# Patient Record
Sex: MALE | Race: WHITE | NOT HISPANIC OR LATINO | Employment: FULL TIME | URBAN - METROPOLITAN AREA
[De-identification: names, ages, dates, MRNs, and addresses within clinical notes are randomized per-mention and may not be internally consistent; named-entity substitution may affect disease eponyms.]

---

## 2017-04-13 ENCOUNTER — ALLSCRIPTS OFFICE VISIT (OUTPATIENT)
Dept: OTHER | Facility: OTHER | Age: 47
End: 2017-04-13

## 2017-06-06 ENCOUNTER — ALLSCRIPTS OFFICE VISIT (OUTPATIENT)
Dept: OTHER | Facility: OTHER | Age: 47
End: 2017-06-06

## 2018-01-13 VITALS
SYSTOLIC BLOOD PRESSURE: 138 MMHG | RESPIRATION RATE: 14 BRPM | DIASTOLIC BLOOD PRESSURE: 92 MMHG | WEIGHT: 171 LBS | HEIGHT: 72 IN | BODY MASS INDEX: 23.16 KG/M2 | HEART RATE: 78 BPM | TEMPERATURE: 97.1 F

## 2018-01-13 VITALS
HEIGHT: 60 IN | RESPIRATION RATE: 14 BRPM | WEIGHT: 165.38 LBS | SYSTOLIC BLOOD PRESSURE: 126 MMHG | DIASTOLIC BLOOD PRESSURE: 82 MMHG | BODY MASS INDEX: 32.47 KG/M2 | TEMPERATURE: 97.8 F | HEART RATE: 84 BPM

## 2018-02-20 ENCOUNTER — APPOINTMENT (OUTPATIENT)
Dept: URGENT CARE | Facility: CLINIC | Age: 48
End: 2018-02-20

## 2018-02-20 PROCEDURE — G0383 LEV 4 HOSP TYPE B ED VISIT: HCPCS | Performed by: FAMILY MEDICINE

## 2018-02-20 RX ORDER — ZOLPIDEM TARTRATE 5 MG/1
5 TABLET ORAL
COMMUNITY
Start: 2017-06-06 | End: 2018-04-02 | Stop reason: SDUPTHER

## 2018-02-22 ENCOUNTER — OFFICE VISIT (OUTPATIENT)
Dept: FAMILY MEDICINE CLINIC | Facility: CLINIC | Age: 48
End: 2018-02-22
Payer: COMMERCIAL

## 2018-02-22 VITALS
RESPIRATION RATE: 16 BRPM | HEIGHT: 72 IN | TEMPERATURE: 98 F | WEIGHT: 172 LBS | DIASTOLIC BLOOD PRESSURE: 70 MMHG | SYSTOLIC BLOOD PRESSURE: 110 MMHG | HEART RATE: 64 BPM | BODY MASS INDEX: 23.3 KG/M2

## 2018-02-22 DIAGNOSIS — K64.9 HEMORRHOIDS, UNSPECIFIED HEMORRHOID TYPE: ICD-10-CM

## 2018-02-22 DIAGNOSIS — N53.19 ABNORMAL EJACULATION: Primary | ICD-10-CM

## 2018-02-22 DIAGNOSIS — N45.1 EPIDIDYMITIS: ICD-10-CM

## 2018-02-22 LAB
SL AMB  POCT GLUCOSE, UA: NEGATIVE
SL AMB LEUKOCYTE ESTERASE,UA: NEGATIVE
SL AMB POCT BILIRUBIN,UA: NEGATIVE
SL AMB POCT BLOOD,UA: NEGATIVE
SL AMB POCT CLARITY,UA: NORMAL
SL AMB POCT COLOR,UA: YELLOW
SL AMB POCT KETONES,UA: NEGATIVE
SL AMB POCT NITRITE,UA: NEGATIVE
SL AMB POCT PH,UA: 5
SL AMB POCT SPECIFIC GRAVITY,UA: 1.03
SL AMB POCT URINE PROTEIN: NEGATIVE
SL AMB POCT UROBILINOGEN: NEGATIVE

## 2018-02-22 PROCEDURE — 81002 URINALYSIS NONAUTO W/O SCOPE: CPT | Performed by: FAMILY MEDICINE

## 2018-02-22 PROCEDURE — 1036F TOBACCO NON-USER: CPT | Performed by: FAMILY MEDICINE

## 2018-02-22 PROCEDURE — 3008F BODY MASS INDEX DOCD: CPT | Performed by: FAMILY MEDICINE

## 2018-02-22 PROCEDURE — 99214 OFFICE O/P EST MOD 30 MIN: CPT | Performed by: FAMILY MEDICINE

## 2018-02-22 RX ORDER — LEVOFLOXACIN 500 MG/1
500 TABLET, FILM COATED ORAL EVERY 24 HOURS
Qty: 10 TABLET | Refills: 0 | Status: SHIPPED | OUTPATIENT
Start: 2018-02-22 | End: 2018-03-04

## 2018-02-22 NOTE — PATIENT INSTRUCTIONS
Please start Aleve take 2 tablets twice a day after food for the next 7-10 days   please take Levaquin, antibiotic, 1 tablet once a day for 10 days   please schedule you follow-up with Urology and Colorectal surgery   Please  bring your urine sample to our office or our lab

## 2018-02-22 NOTE — PROGRESS NOTES
FAMILY PRACTICE OFFICE VISIT       NAME: Matty Ruiz  AGE: 50 y o  SEX: male       : 1970        MRN: [id number]    DATE: 2018  TIME: 4:42 PM    Assessment and Plan     Problem List Items Addressed This Visit     None      Visit Diagnoses     Abnormal ejaculation    -  Primary    Relevant Orders    UA w Reflex to Microscopic w Reflex to Culture -Lab Collect    Ambulatory referral to Urology    Hemorrhoids, unspecified hemorrhoid type        Epididymitis        Relevant Medications    levofloxacin (LEVAQUIN) 500 mg tablet        Patient presents for evaluation of pelvic floor discomfort  His exam is overall unremarkable aside from localized tenderness at left epididymis  Patient denies testicular pain per se his symptoms could be related to groin strain due to recurrent lifting at work  I advised him to minimize lifting  Will treat him empirically with Levaquin 500 mg daily for 10 days and Aleve over-the-counter as 2 tablets twice a day for the next few days  I strongly advised patient to schedule further evaluation with [facility]  Will facilitate prompt appointment  Urinalysis is normal   Episode of bright red blood per rectum x1  Reported history of hemorrhoids  Patient has been experiencing intermittent symptoms of constipation  I am referring him to colorectal surgeon for further evaluation  Patient denies any recurrences of symptoms  I emphasized avoidance of constipation in prevention of further recurrences of bright red blood per rectum      Patient Instructions   Please start Aleve take 2 tablets twice a day after food for the next 7-10 days   please take Levaquin, antibiotic, 1 tablet once a day for 10 days   please schedule you follow-up with Urology and Colorectal surgery   Please  bring your urine sample to our office or our lab          Chief Complaint     Chief Complaint   Patient presents with    Follow-up     Pt is here w/ c/o small amount of blood in his stool and thinks he may have a hemrhoid        History of Present Illness     Patient presents for evaluation of few concerns  He experienced discomfort during sexual intercourse approximately 7-10 days ago and feels that there is ongoing vaguely defined discomfort/paresthesia in bilateral groin area resembling muscle pull  Patient states that he might of had incomplete injectable a shin during sexual intercourse and is concerned about possible injury  He was not able to achieve erection afterwards  He denies any spontaneous penile discharge or blood in semen, he denies dysuria  No frequency or urgency  Patient is repetitively lifting heavy weights at work  Patient is also concerned about recurrent symptoms of hemorrhoids  He denies any abdominal pain or dyspepsia, no nausea vomiting  He has been experiencing recurrent constipation and noticed bright red blood per rectum x1 after hard bowel movement today's ago  No recurrences  Nose continues bleeding  His appetite is normal    No back pain, no bowel or bladder dysfunction  No rash or edema, no discoloration  Patient denies testicular discomfort        Review of Systems   Review of Systems   Constitutional: Negative  HENT: Negative  Eyes: Negative  Respiratory: Negative  Cardiovascular: Negative  Gastrointestinal: Positive for blood in stool  Negative for abdominal pain  Endocrine: Negative  Genitourinary: Positive for penile pain  Negative for difficulty urinating, dysuria and testicular pain  Musculoskeletal: Negative  Skin: Negative  Neurological: Negative  Hematological: Negative  Psychiatric/Behavioral: The patient is nervous/anxious  Active Problem List   There is no problem list on file for this patient  Past Medical History:  No past medical history on file  Past Surgical History:  No past surgical history on file      Family History:  Family History   Problem Relation Age of Onset    Cancer Mother Social History:  Social History     Social History    Marital status: Single     Spouse name: N/A    Number of children: N/A    Years of education: N/A     Occupational History    Not on file  Social History Main Topics    Smoking status: Never Smoker    Smokeless tobacco: Never Used    Alcohol use Yes      Comment: beer on weekends    Drug use: No    Sexual activity: Not on file     Other Topics Concern    Not on file     Social History Narrative    No narrative on file     I have reviewed the patient's medical history in detail; there are no changes to the history as noted in the electronic medical record  Objective     Vitals:    02/22/18 1559   BP: 110/70   Pulse: 64   Resp: 16   Temp: 98 °F (36 7 °C)     Wt Readings from Last 3 Encounters:   02/22/18 78 kg (172 lb)   06/06/17 77 6 kg (171 lb)   04/13/17 75 kg (165 lb 6 1 oz)       Physical Exam   Constitutional: He appears well-developed and well-nourished  Abdominal: Soft  Normal aorta and bowel sounds are normal  There is no tenderness  There is no rebound and no guarding  Hernia confirmed negative in the right inguinal area and confirmed negative in the left inguinal area  Genitourinary: Penis normal  Right testis shows no mass, no swelling and no tenderness  Left testis shows no mass and no swelling (tenderness at left epididymis)  Circumcised  Lymphadenopathy:        Left: Inguinal (Small subcentimeter left inguinal lymph node, mobile, nontender, rubbery wish, benign characteristics) adenopathy present  Psychiatric: Judgment and thought content normal  His mood appears anxious     Concerned       Pertinent Laboratory/Diagnostic Studies:  No results found for: GLUCOSE, BUN, CREATININE, CALCIUM, NA, K, CO2, CL  No results found for: ALT, AST, GGT, ALKPHOS, BILITOT    No results found for: WBC, HGB, HCT, MCV, PLT    No results found for: TSH    No results found for: CHOL  No results found for: TRIG  No results found for: HDL  No results found for: [address]  No results found for: HGBA1C    No results found for this or any previous visit  Orders Placed This Encounter   Procedures    UA w Reflex to Microscopic w Reflex to Culture -Lab Collect    Ambulatory referral to Urology       ALLERGIES:  Allergies   Allergen Reactions    Bactrim [Sulfamethoxazole-Trimethoprim] Hives    Sulfa Antibiotics        Current Medications     Current Outpatient Prescriptions   Medication Sig Dispense Refill    zolpidem (AMBIEN) 5 mg tablet Take 5 mg by mouth daily at bedtime as needed        levofloxacin (LEVAQUIN) 500 mg tablet Take 1 tablet (500 mg total) by mouth every 24 hours for 10 days 10 tablet 0     No current facility-administered medications for this visit  Health Maintenance   There are no preventive care reminders to display for this patient  There is no immunization history on file for this patient      Jamie Balbuena MD

## 2018-04-02 DIAGNOSIS — G47.00 INSOMNIA, UNSPECIFIED TYPE: Primary | ICD-10-CM

## 2018-04-02 RX ORDER — ZOLPIDEM TARTRATE 5 MG/1
TABLET ORAL
Qty: 30 TABLET | Refills: 2 | Status: SHIPPED | OUTPATIENT
Start: 2018-04-02 | End: 2018-07-02 | Stop reason: SDUPTHER

## 2018-07-02 DIAGNOSIS — G47.00 INSOMNIA, UNSPECIFIED TYPE: ICD-10-CM

## 2018-07-02 RX ORDER — ZOLPIDEM TARTRATE 5 MG/1
TABLET ORAL
Qty: 30 TABLET | Refills: 2 | Status: SHIPPED | OUTPATIENT
Start: 2018-07-02 | End: 2018-10-01 | Stop reason: SDUPTHER

## 2018-10-01 DIAGNOSIS — G47.00 INSOMNIA, UNSPECIFIED TYPE: ICD-10-CM

## 2018-10-01 RX ORDER — ZOLPIDEM TARTRATE 5 MG/1
TABLET ORAL
Qty: 30 TABLET | Refills: 2 | Status: SHIPPED | OUTPATIENT
Start: 2018-10-01 | End: 2018-12-26 | Stop reason: SDUPTHER

## 2018-12-26 DIAGNOSIS — G47.00 INSOMNIA, UNSPECIFIED TYPE: ICD-10-CM

## 2018-12-26 RX ORDER — ZOLPIDEM TARTRATE 5 MG/1
TABLET ORAL
Qty: 30 TABLET | Refills: 2 | Status: SHIPPED | OUTPATIENT
Start: 2018-12-26 | End: 2019-04-02 | Stop reason: SDUPTHER

## 2019-01-17 ENCOUNTER — OFFICE VISIT (OUTPATIENT)
Dept: FAMILY MEDICINE CLINIC | Facility: CLINIC | Age: 49
End: 2019-01-17

## 2019-01-17 VITALS
SYSTOLIC BLOOD PRESSURE: 130 MMHG | HEIGHT: 72 IN | DIASTOLIC BLOOD PRESSURE: 80 MMHG | HEART RATE: 62 BPM | TEMPERATURE: 98.3 F | WEIGHT: 174.2 LBS | RESPIRATION RATE: 16 BRPM | BODY MASS INDEX: 23.6 KG/M2

## 2019-01-17 DIAGNOSIS — K64.9 HEMORRHOIDS, UNSPECIFIED HEMORRHOID TYPE: Primary | ICD-10-CM

## 2019-01-17 PROCEDURE — 99213 OFFICE O/P EST LOW 20 MIN: CPT | Performed by: FAMILY MEDICINE

## 2019-01-17 NOTE — PROGRESS NOTES
FAMILY PRACTICE OFFICE VISIT       NAME: Matty Ruiz  AGE: 46 y o  SEX: male       : 1970        MRN: [id number]    DATE: 2019  TIME: 4:19 PM    Assessment and Plan     Problem List Items Addressed This Visit     Hemorrhoids - Primary    Relevant Medications    hydrocortisone (ANUSOL-HC, PROCTOSOL HC,) 2 5 % rectal cream       Patient was given prescription for Anusol HC cream to apply 2-3 times daily on the affected area  He will continue with Sitz baths at least once daily if not more often  If symptoms persist over the next 10 days without improvement he was also given referral to colorectal surgeon Tapan Aleman for consultation and treatment      There are no Patient Instructions on file for this visit  Chief Complaint     Chief Complaint   Patient presents with    Hemorrhoids     Patient is here for hemmorrhoids and a rash x 1 month  History of Present Illness     Patient states for the past week or 2 he has noticed discomfort and itching of his rectal area  He has tried [employer] without much relief in his symptoms  He denies any constipation or heavy lifting  He states he has had intermittent bouts with hemorrhoids since he was a teenager  He denies any melena or hematochezia        Review of Systems   Review of Systems   Constitutional: Negative  Gastrointestinal: Negative  Genitourinary:        As per HPI       Active Problem List     Patient Active Problem List   Diagnosis    Hemorrhoids       Past Medical History:  No past medical history on file  Past Surgical History:  No past surgical history on file  Family History:  Family History   Problem Relation Age of Onset   Jessie Lynch Cancer Mother     COPD Mother        Social History:  Social History     Social History    Marital status: Single     Spouse name: N/A    Number of children: N/A    Years of education: N/A     Occupational History    Not on file       Social History Main Topics    Smoking status: Never Smoker    Smokeless tobacco: Never Used    Alcohol use Yes      Comment: beer on weekends    Drug use: No    Sexual activity: Yes     Other Topics Concern    Not on file     Social History Narrative    No narrative on file       Objective     Vitals:    01/17/19 1558   BP: 130/80   Pulse: 62   Resp: 16   Temp: 98 3 °F (36 8 °C)     Wt Readings from Last 3 Encounters:   01/17/19 79 kg (174 lb 3 2 oz)   02/22/18 78 kg (172 lb)   06/06/17 77 6 kg (171 lb)       Physical Exam   Constitutional: No distress  Abdominal:   External examination of rectal area shows several large external hemorrhoids that are skin colored  There is no signs of blood clotting  He does have some perirectal redness of skin  Pertinent Laboratory/Diagnostic Studies:  No results found for: GLUCOSE, BUN, CREATININE, CALCIUM, NA, K, CO2, CL  No results found for: ALT, AST, GGT, ALKPHOS, BILITOT    No results found for: WBC, HGB, HCT, MCV, PLT    No results found for: TSH    No results found for: CHOL  No results found for: TRIG  No results found for: HDL  No results found for: LDLCALC  No results found for: HGBA1C    Results for orders placed or performed in visit on 02/22/18   POCT urine dip   Result Value Ref Range    LEUKOCYTE ESTERASE,UA negative     NITRITE,UA negative     SL AMB POCT UROBILINOGEN negative     POCT URINE PROTEIN negative      PH,UA 5 0     BLOOD,UA negative     SPECIFIC GRAVITY,UA 1 030     KETONES,UA negative     BILIRUBIN,UA negative     GLUCOSE, UA negative      COLOR,UA yellow     CLARITY,UA transparent        No orders of the defined types were placed in this encounter        ALLERGIES:  Allergies   Allergen Reactions    Bactrim [Sulfamethoxazole-Trimethoprim] Hives    Sulfa Antibiotics        Current Medications     Current Outpatient Prescriptions   Medication Sig Dispense Refill    zolpidem (AMBIEN) 5 mg tablet TAKE 1 TABLET BY MOUTH EVERY DAY AT BEDTIME AS NEEDED 30 tablet 2    hydrocortisone (ANUSOL-HC, PROCTOSOL HC,) 2 5 % rectal cream Insert into the rectum 3 (three) times a day 28 35 g 3     No current facility-administered medications for this visit  Health Maintenance     Health Maintenance   Topic Date Due    CRC Screening: Colonoscopy  1970    DTaP,Tdap,and Td Vaccines (1 - Tdap) 12/19/1989    INFLUENZA VACCINE  07/01/2018    Depression Screening PHQ  01/17/2020       There is no immunization history on file for this patient      Carleen Hogan MD

## 2019-03-27 DIAGNOSIS — K64.9 HEMORRHOIDS, UNSPECIFIED HEMORRHOID TYPE: ICD-10-CM

## 2019-04-02 DIAGNOSIS — G47.00 INSOMNIA, UNSPECIFIED TYPE: ICD-10-CM

## 2019-04-02 RX ORDER — ZOLPIDEM TARTRATE 5 MG/1
TABLET ORAL
Qty: 30 TABLET | Refills: 2 | Status: SHIPPED | OUTPATIENT
Start: 2019-04-02 | End: 2019-07-01 | Stop reason: SDUPTHER

## 2019-04-26 DIAGNOSIS — K64.9 HEMORRHOIDS, UNSPECIFIED HEMORRHOID TYPE: ICD-10-CM

## 2019-07-01 DIAGNOSIS — K64.9 HEMORRHOIDS, UNSPECIFIED HEMORRHOID TYPE: ICD-10-CM

## 2019-07-01 DIAGNOSIS — G47.00 INSOMNIA, UNSPECIFIED TYPE: ICD-10-CM

## 2019-07-01 RX ORDER — ZOLPIDEM TARTRATE 5 MG/1
TABLET ORAL
Qty: 30 TABLET | Refills: 2 | Status: SHIPPED | OUTPATIENT
Start: 2019-07-01 | End: 2019-09-30 | Stop reason: SDUPTHER

## 2019-07-03 DIAGNOSIS — K64.9 HEMORRHOIDS, UNSPECIFIED HEMORRHOID TYPE: ICD-10-CM

## 2019-09-20 DIAGNOSIS — K64.9 HEMORRHOIDS, UNSPECIFIED HEMORRHOID TYPE: ICD-10-CM

## 2019-09-30 DIAGNOSIS — G47.00 INSOMNIA, UNSPECIFIED TYPE: ICD-10-CM

## 2019-10-01 RX ORDER — ZOLPIDEM TARTRATE 5 MG/1
TABLET ORAL
Qty: 30 TABLET | Refills: 2 | Status: SHIPPED | OUTPATIENT
Start: 2019-10-01 | End: 2019-12-30 | Stop reason: SDUPTHER

## 2019-12-02 DIAGNOSIS — K64.9 HEMORRHOIDS, UNSPECIFIED HEMORRHOID TYPE: ICD-10-CM

## 2019-12-30 DIAGNOSIS — G47.00 INSOMNIA, UNSPECIFIED TYPE: ICD-10-CM

## 2019-12-30 RX ORDER — ZOLPIDEM TARTRATE 5 MG/1
TABLET ORAL
Qty: 30 TABLET | Refills: 0 | Status: SHIPPED | OUTPATIENT
Start: 2019-12-30 | End: 2020-02-05 | Stop reason: SDUPTHER

## 2020-01-20 ENCOUNTER — APPOINTMENT (OUTPATIENT)
Dept: URGENT CARE | Facility: CLINIC | Age: 50
End: 2020-01-20
Payer: OTHER MISCELLANEOUS

## 2020-01-20 ENCOUNTER — APPOINTMENT (OUTPATIENT)
Dept: RADIOLOGY | Facility: CLINIC | Age: 50
End: 2020-01-20
Payer: OTHER MISCELLANEOUS

## 2020-01-20 DIAGNOSIS — S39.92XA LOWER BACK INJURY, INITIAL ENCOUNTER: Primary | ICD-10-CM

## 2020-01-20 DIAGNOSIS — S39.92XA LOWER BACK INJURY, INITIAL ENCOUNTER: ICD-10-CM

## 2020-01-20 PROCEDURE — 72100 X-RAY EXAM L-S SPINE 2/3 VWS: CPT

## 2020-01-20 PROCEDURE — G0383 LEV 4 HOSP TYPE B ED VISIT: HCPCS | Performed by: NURSE PRACTITIONER

## 2020-01-20 PROCEDURE — 99284 EMERGENCY DEPT VISIT MOD MDM: CPT | Performed by: NURSE PRACTITIONER

## 2020-01-24 ENCOUNTER — APPOINTMENT (OUTPATIENT)
Dept: URGENT CARE | Facility: CLINIC | Age: 50
End: 2020-01-24
Payer: OTHER MISCELLANEOUS

## 2020-01-24 PROCEDURE — 99213 OFFICE O/P EST LOW 20 MIN: CPT | Performed by: PHYSICIAN ASSISTANT

## 2020-01-31 ENCOUNTER — APPOINTMENT (OUTPATIENT)
Dept: URGENT CARE | Facility: CLINIC | Age: 50
End: 2020-01-31
Payer: OTHER MISCELLANEOUS

## 2020-01-31 DIAGNOSIS — G47.00 INSOMNIA, UNSPECIFIED TYPE: ICD-10-CM

## 2020-01-31 PROCEDURE — 99213 OFFICE O/P EST LOW 20 MIN: CPT | Performed by: PHYSICIAN ASSISTANT

## 2020-02-03 RX ORDER — ZOLPIDEM TARTRATE 5 MG/1
TABLET ORAL
Qty: 30 TABLET | Refills: 0 | OUTPATIENT
Start: 2020-02-03

## 2020-02-04 ENCOUNTER — TELEPHONE (OUTPATIENT)
Dept: FAMILY MEDICINE CLINIC | Facility: CLINIC | Age: 50
End: 2020-02-04

## 2020-02-04 NOTE — TELEPHONE ENCOUNTER
Pt came in asking about the Zolpidem, it was refused to be refilled, I told him it may be due to the fact that he has not been seen in a year, he is coming on Friday @4pm, but he would like to know if he can have a temporary supply of the zolpidem for the next couple days until he is seen, please advise @ [phone]

## 2020-02-05 DIAGNOSIS — G47.00 INSOMNIA, UNSPECIFIED TYPE: ICD-10-CM

## 2020-02-05 RX ORDER — ZOLPIDEM TARTRATE 5 MG/1
5 TABLET ORAL
Qty: 30 TABLET | Refills: 0 | Status: SHIPPED | OUTPATIENT
Start: 2020-02-05 | End: 2020-02-07 | Stop reason: SDUPTHER

## 2020-02-07 ENCOUNTER — OFFICE VISIT (OUTPATIENT)
Dept: FAMILY MEDICINE CLINIC | Facility: CLINIC | Age: 50
End: 2020-02-07
Payer: COMMERCIAL

## 2020-02-07 VITALS
OXYGEN SATURATION: 97 % | BODY MASS INDEX: 24.06 KG/M2 | TEMPERATURE: 98.3 F | WEIGHT: 177.6 LBS | RESPIRATION RATE: 16 BRPM | HEART RATE: 64 BPM | SYSTOLIC BLOOD PRESSURE: 130 MMHG | HEIGHT: 72 IN | DIASTOLIC BLOOD PRESSURE: 88 MMHG

## 2020-02-07 DIAGNOSIS — G47.00 INSOMNIA, UNSPECIFIED TYPE: ICD-10-CM

## 2020-02-07 DIAGNOSIS — Z00.00 WELL ADULT EXAM: Primary | ICD-10-CM

## 2020-02-07 PROBLEM — G47.09 OTHER INSOMNIA: Status: ACTIVE | Noted: 2020-02-07

## 2020-02-07 PROCEDURE — 99396 PREV VISIT EST AGE 40-64: CPT | Performed by: FAMILY MEDICINE

## 2020-02-07 PROCEDURE — 3008F BODY MASS INDEX DOCD: CPT | Performed by: FAMILY MEDICINE

## 2020-02-07 RX ORDER — ZOLPIDEM TARTRATE 5 MG/1
5 TABLET ORAL
Qty: 30 TABLET | Refills: 5 | Status: SHIPPED | OUTPATIENT
Start: 2020-02-07 | End: 2020-07-06 | Stop reason: SDUPTHER

## 2020-02-07 NOTE — ASSESSMENT & PLAN NOTE
Well adult  Patient overall appears to be in stable health  He was given a refill on his zolpidem 5 milligrams to use as needed for insomnia    He was given referral to have colonoscopy for colon cancer screening test

## 2020-02-07 NOTE — PROGRESS NOTES
FAMILY PRACTICE OFFICE VISIT       NAME: Matty Ruiz  AGE: 44 y o  SEX: male       : 1970        MRN: [id number]    DATE: 2020  TIME: 5:13 PM    Assessment and Plan     Problem List Items Addressed This Visit        Other    Insomnia    Relevant Medications    zolpidem (AMBIEN) 5 mg tablet    Well adult exam - Primary     Well adult  Patient overall appears to be in stable health  He was given a refill on his zolpidem 5 milligrams to use as needed for insomnia  He was given referral to have colonoscopy for colon cancer screening test                    Chief Complaint     Chief Complaint   Patient presents with    Annual Exam     Physical       History of Present Illness     Patient denies any recent illness  He reports having a muscle strain of his left back for which she was seen by a company physician  He has been doing some stretches and feels like symptoms are slowly improving  Patient continues to require zolpidem 5 milligrams at bedtime for sleeping since his mother passed away approximately 2 years ago  Review of Systems   Review of Systems   Constitutional: Negative  HENT: Negative  Respiratory: Negative  Cardiovascular: Negative  Gastrointestinal: Negative  Genitourinary: Negative  Musculoskeletal:        As per HPI   Neurological: Negative  Psychiatric/Behavioral:        As per HPI       Active Problem List     Patient Active Problem List   Diagnosis    Hemorrhoids    Insomnia    Well adult exam       Past Medical History:  No past medical history on file  Past Surgical History:  No past surgical history on file      Family History:  Family History   Problem Relation Age of Onset   [place] Drop Cancer Mother     COPD Mother        Social History:  Social History     Socioeconomic History    Marital status: Single     Spouse name: Not on file    Number of children: Not on file    Years of education: Not on file    Highest education level: Not on file Occupational History    Not on file   Social Needs    Financial resource strain: Not on file    Food insecurity:     Worry: Not on file     Inability: Not on file    Transportation needs:     Medical: Not on file     Non-medical: Not on file   Tobacco Use    Smoking status: Never Smoker    Smokeless tobacco: Never Used   Substance and Sexual Activity    Alcohol use: Yes     Frequency: Monthly or less     Comment: beer on weekends    Drug use: No    Sexual activity: Yes   Lifestyle    Physical activity:     Days per week: Not on file     Minutes per session: Not on file    Stress: Not on file   Relationships    Social connections:     Talks on phone: Not on file     Gets together: Not on file     Attends Evangelical service: Not on file     Active member of club or organization: Not on file     Attends meetings of clubs or organizations: Not on file     Relationship status: Not on file    Intimate partner violence:     Fear of current or ex partner: Not on file     Emotionally abused: Not on file     Physically abused: Not on file     Forced sexual activity: Not on file   Other Topics Concern    Not on file   Social History Narrative    Not on file       Objective     Vitals:    02/07/20 1551   BP: 130/88   Pulse: 64   Resp: 16   Temp: 98 3 °F (36 8 °C)   SpO2: 97%     Wt Readings from Last 3 Encounters:   02/07/20 80 6 kg (177 lb 9 6 oz)   01/17/19 79 kg (174 lb 3 2 oz)   02/22/18 78 kg (172 lb)       Physical Exam   Constitutional: He is oriented to person, place, and time  No distress  HENT:   Right Ear: External ear normal    Left Ear: External ear normal    Mouth/Throat: Oropharynx is clear and moist  No oropharyngeal exudate  Tympanic membranes within normal limits bilaterally   Cardiovascular: Normal rate, regular rhythm and normal heart sounds  No murmur heard  Pulmonary/Chest: Effort normal and breath sounds normal  No respiratory distress  He has no wheezes  He has no rales  Abdominal: Soft  Bowel sounds are normal  He exhibits no distension and no mass  There is no tenderness  There is no rebound and no guarding  Musculoskeletal: He exhibits no edema  Lymphadenopathy:     He has no cervical adenopathy  Neurological: He is alert and oriented to person, place, and time  No cranial nerve deficit  Psychiatric: He has a normal mood and affect  His behavior is normal  Judgment and thought content normal        Pertinent Laboratory/Diagnostic Studies:  No results found for: GLUCOSE, BUN, CREATININE, CALCIUM, NA, K, CO2, CL  No results found for: ALT, AST, GGT, ALKPHOS, BILITOT    No results found for: WBC, HGB, HCT, MCV, PLT    No results found for: TSH    No results found for: CHOL  No results found for: TRIG  No results found for: HDL  No results found for: LDLCALC  No results found for: HGBA1C    Results for orders placed or performed in visit on 02/22/18   POCT urine dip   Result Value Ref Range    LEUKOCYTE ESTERASE,UA negative     NITRITE,UA negative     SL AMB POCT UROBILINOGEN negative     POCT URINE PROTEIN negative      PH,UA 5 0     BLOOD,UA negative     SPECIFIC GRAVITY,UA 1 030     KETONES,UA negative     BILIRUBIN,UA negative     GLUCOSE, UA negative      COLOR,UA yellow     CLARITY,UA transparent        No orders of the defined types were placed in this encounter  ALLERGIES:  Allergies   Allergen Reactions    Bactrim [Sulfamethoxazole-Trimethoprim] Hives    Sulfa Antibiotics        Current Medications     Current Outpatient Medications   Medication Sig Dispense Refill    PROCTOSOL HC 2 5 % rectal cream INSERT INTO THE RECTUM 3 (THREE) TIMES A DAY 28 35 g 3    zolpidem (AMBIEN) 5 mg tablet Take 1 tablet (5 mg total) by mouth daily at bedtime as needed (as needed) 30 tablet 5     No current facility-administered medications for this visit            Health Maintenance     Health Maintenance   Topic Date Due    CRC Screening: Colonoscopy  1970    DTaP,Tdap,and Td Vaccines (1 - Tdap) 12/19/1979    HIV Screening  12/19/1983    Annual Physical  12/19/1986    Influenza Vaccine  03/05/2020 (Originally 7/1/2019)    Depression Screening PHQ  02/07/2021    BMI: Adult  02/07/2021    Pneumococcal Vaccine: 65+ Years (1 of 2 - PCV13) 12/19/2033    Pneumococcal Vaccine: Pediatrics (0 to 5 Years) and At-Risk Patients (6 to 59 Years)  Aged Out    HIB Vaccine  Aged Out    Hepatitis B Vaccine  Aged Out    IPV Vaccine  Aged Out    Hepatitis A Vaccine  Aged Out    Meningococcal ACWY Vaccine  Aged Out    HPV Vaccine  Aged Out       There is no immunization history on file for this patient      Sabrina Danielson MD

## 2020-02-14 ENCOUNTER — APPOINTMENT (OUTPATIENT)
Dept: URGENT CARE | Facility: CLINIC | Age: 50
End: 2020-02-14
Payer: OTHER MISCELLANEOUS

## 2020-02-14 PROCEDURE — 99213 OFFICE O/P EST LOW 20 MIN: CPT | Performed by: PREVENTIVE MEDICINE

## 2020-02-22 DIAGNOSIS — K64.9 HEMORRHOIDS, UNSPECIFIED HEMORRHOID TYPE: ICD-10-CM

## 2020-03-23 DIAGNOSIS — K64.9 HEMORRHOIDS, UNSPECIFIED HEMORRHOID TYPE: ICD-10-CM

## 2020-06-04 DIAGNOSIS — K64.9 HEMORRHOIDS, UNSPECIFIED HEMORRHOID TYPE: ICD-10-CM

## 2020-06-04 RX ORDER — HYDROCORTISONE 25 MG/G
CREAM TOPICAL
Qty: 28.35 G | Refills: 1 | Status: SHIPPED | OUTPATIENT
Start: 2020-06-04 | End: 2020-08-30

## 2020-07-06 DIAGNOSIS — G47.00 INSOMNIA, UNSPECIFIED TYPE: ICD-10-CM

## 2020-07-06 RX ORDER — ZOLPIDEM TARTRATE 5 MG/1
5 TABLET ORAL
Qty: 30 TABLET | Refills: 0 | Status: SHIPPED | OUTPATIENT
Start: 2020-07-06 | End: 2020-08-07

## 2020-08-06 DIAGNOSIS — G47.00 INSOMNIA, UNSPECIFIED TYPE: ICD-10-CM

## 2020-08-07 RX ORDER — ZOLPIDEM TARTRATE 5 MG/1
5 TABLET ORAL
Qty: 30 TABLET | Refills: 3 | Status: SHIPPED | OUTPATIENT
Start: 2020-08-07 | End: 2020-12-07

## 2020-08-29 DIAGNOSIS — K64.9 HEMORRHOIDS, UNSPECIFIED HEMORRHOID TYPE: ICD-10-CM

## 2020-08-30 RX ORDER — HYDROCORTISONE 25 MG/G
CREAM TOPICAL
Qty: 28.35 G | Refills: 1 | Status: SHIPPED | OUTPATIENT
Start: 2020-08-30 | End: 2020-09-29

## 2020-09-29 DIAGNOSIS — K64.9 HEMORRHOIDS, UNSPECIFIED HEMORRHOID TYPE: ICD-10-CM

## 2020-09-29 RX ORDER — HYDROCORTISONE 25 MG/G
CREAM TOPICAL
Qty: 30 G | Refills: 1 | Status: SHIPPED | OUTPATIENT
Start: 2020-09-29 | End: 2020-11-02

## 2020-11-02 DIAGNOSIS — K64.9 HEMORRHOIDS, UNSPECIFIED HEMORRHOID TYPE: ICD-10-CM

## 2020-11-02 RX ORDER — HYDROCORTISONE 25 MG/G
CREAM TOPICAL
Qty: 30 G | Refills: 1 | Status: SHIPPED | OUTPATIENT
Start: 2020-11-02 | End: 2021-02-05

## 2020-12-07 DIAGNOSIS — G47.00 INSOMNIA, UNSPECIFIED TYPE: ICD-10-CM

## 2020-12-07 RX ORDER — ZOLPIDEM TARTRATE 5 MG/1
5 TABLET ORAL
Qty: 30 TABLET | Refills: 3 | Status: SHIPPED | OUTPATIENT
Start: 2020-12-07 | End: 2021-04-06

## 2021-02-05 DIAGNOSIS — K64.9 HEMORRHOIDS, UNSPECIFIED HEMORRHOID TYPE: ICD-10-CM

## 2021-02-05 RX ORDER — HYDROCORTISONE 25 MG/G
CREAM TOPICAL
Qty: 30 G | Refills: 1 | Status: SHIPPED | OUTPATIENT
Start: 2021-02-05 | End: 2021-03-08

## 2021-03-07 DIAGNOSIS — K64.9 HEMORRHOIDS, UNSPECIFIED HEMORRHOID TYPE: ICD-10-CM

## 2021-03-08 RX ORDER — HYDROCORTISONE 25 MG/G
CREAM TOPICAL
Qty: 30 G | Refills: 1 | Status: SHIPPED | OUTPATIENT
Start: 2021-03-08 | End: 2021-05-07

## 2021-04-05 DIAGNOSIS — G47.00 INSOMNIA, UNSPECIFIED TYPE: ICD-10-CM

## 2021-04-06 RX ORDER — ZOLPIDEM TARTRATE 5 MG/1
5 TABLET ORAL
Qty: 30 TABLET | Refills: 3 | Status: SHIPPED | OUTPATIENT
Start: 2021-04-06 | End: 2021-08-05 | Stop reason: SDUPTHER

## 2021-05-07 DIAGNOSIS — K64.9 HEMORRHOIDS, UNSPECIFIED HEMORRHOID TYPE: ICD-10-CM

## 2021-05-07 RX ORDER — HYDROCORTISONE 25 MG/G
CREAM TOPICAL
Qty: 30 G | Refills: 1 | Status: SHIPPED | OUTPATIENT
Start: 2021-05-07 | End: 2021-07-06

## 2021-08-04 DIAGNOSIS — G47.00 INSOMNIA, UNSPECIFIED TYPE: ICD-10-CM

## 2021-08-05 RX ORDER — ZOLPIDEM TARTRATE 5 MG/1
5 TABLET ORAL
Qty: 30 TABLET | Refills: 3 | OUTPATIENT
Start: 2021-08-05

## 2021-08-05 NOTE — TELEPHONE ENCOUNTER
Patient scheduled for 8/13 with Chel  Patient request small quantity to be sent to pharmacy to get him through until the appointment

## 2021-08-13 ENCOUNTER — OFFICE VISIT (OUTPATIENT)
Dept: FAMILY MEDICINE CLINIC | Facility: CLINIC | Age: 51
End: 2021-08-13

## 2021-08-13 DIAGNOSIS — R53.83 FATIGUE, UNSPECIFIED TYPE: ICD-10-CM

## 2021-08-13 DIAGNOSIS — K64.9 HEMORRHOIDS, UNSPECIFIED HEMORRHOID TYPE: ICD-10-CM

## 2021-08-13 DIAGNOSIS — Z13.220 LIPID SCREENING: ICD-10-CM

## 2021-08-13 DIAGNOSIS — Z00.00 PHYSICAL EXAM: Primary | ICD-10-CM

## 2021-08-13 DIAGNOSIS — G47.00 INSOMNIA, UNSPECIFIED TYPE: ICD-10-CM

## 2021-08-13 PROCEDURE — 3008F BODY MASS INDEX DOCD: CPT | Performed by: NURSE PRACTITIONER

## 2021-08-13 PROCEDURE — 3725F SCREEN DEPRESSION PERFORMED: CPT | Performed by: NURSE PRACTITIONER

## 2021-08-13 PROCEDURE — 99396 PREV VISIT EST AGE 40-64: CPT | Performed by: NURSE PRACTITIONER

## 2021-08-13 PROCEDURE — 1036F TOBACCO NON-USER: CPT | Performed by: NURSE PRACTITIONER

## 2021-08-13 RX ORDER — ZOLPIDEM TARTRATE 5 MG/1
5 TABLET ORAL
Qty: 30 TABLET | Refills: 0 | Status: SHIPPED | OUTPATIENT
Start: 2021-08-13 | End: 2021-09-13

## 2021-08-13 RX ORDER — HYDROCORTISONE 25 MG/G
CREAM TOPICAL
Qty: 30 G | Refills: 1 | Status: SHIPPED | OUTPATIENT
Start: 2021-08-13 | End: 2021-12-14

## 2021-08-13 NOTE — PROGRESS NOTES
FAMILY PRACTICE OFFICE VISIT       NAME: Matty Ruiz  AGE: 44 y o  SEX: male       : 1970        MRN: [id number]    Assessment and Plan     Problem List Items Addressed This Visit        Digestive    Hemorrhoids    Relevant Medications    hydrocortisone (ANUSOL-HC) 2 5 % rectal cream       Other    Insomnia    Relevant Medications    zolpidem (AMBIEN) 5 mg tablet      Other Visit Diagnoses     Physical exam    -  Primary    Lipid screening        Relevant Orders    Lipid panel    Fatigue, unspecified type        Relevant Orders    CBC    Comprehensive metabolic panel    TSH, 3rd generation          1  Physical exam     2  Lipid screening  Lipid panel   3  Fatigue, unspecified type  CBC    Comprehensive metabolic panel    TSH, 3rd generation   4  Hemorrhoids, unspecified hemorrhoid type  hydrocortisone (ANUSOL-HC) 2 5 % rectal cream   5  Insomnia, unspecified type  zolpidem (AMBIEN) 5 mg tablet     Healthy-appearing 57-year-old male presenting today for annual physical exam     Declines Tdap, COVID-19, Shingrix vaccinations  Declines colonoscopy  Declines PSA screening  Will consider checking blood work as noted above  Prescriptions provided  Hemorrhoids: Flares controlled with Anusol  Prescription provided  Insomnia:  Working shift work, 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  to 3:00 a m  Allison Clintonien has been effective for sleep  Follow-up in 1 year for annual physical or sooner if needed  Chief Complaint     Chief Complaint   Patient presents with    Follow-up     medications        History of Present Illness     Chong Soto is a 57-year-old male presenting today for annual physical exam and prescription refills  Uses Anusol for temporary relief of hemorrhoid flares  Does a lot of physical labor and heavy lifting for work, which contributes to hemorrhoid flares  Using Ambien as needed for sleep  Has trouble getting his mind to settle down so he can sleep  With Ambien he is able to fall sleep   Has been using Ambien since around 2018  Experiences no morning grogginess  Sleeps 7:00 p m  to 3:00 a m  due to work schedule  Has never had a colonoscopy and declines  Mom lung cancer, dad prostate cancer  No siblings  COVid vaccines--declines  Last tdap vaccine unsure, likely long ago  Declines up date today  Shingrix: declines  Exercising--   Hiking and enjoys outdoor activites  Work is physical labor  Grounds maintenance at [employer]  Dental exam is up-to-date  Due for eye exam, wears glasses  Review of Systems   Review of Systems   Constitutional: Negative  HENT: Negative  Respiratory: Negative  Cardiovascular: Negative  Gastrointestinal: Negative  Genitourinary: Negative  Musculoskeletal: Negative  Skin: Negative  Neurological: Negative  Hematological: Negative  Psychiatric/Behavioral: Negative  Active Problem List     Patient Active Problem List   Diagnosis    Hemorrhoids    Insomnia    Well adult exam       Past Medical History:  History reviewed  No pertinent past medical history  Past Surgical History:  History reviewed  No pertinent surgical history      Family History:  Family History   Problem Relation Age of Onset   Sandi Isra Cancer Mother     COPD Mother    Sandialanna Matael Lung cancer Mother     Prostate cancer Father        Social History:  Social History     Socioeconomic History    Marital status: Single     Spouse name: Not on file    Number of children: Not on file    Years of education: Not on file    Highest education level: Not on file   Occupational History    Not on file   Tobacco Use    Smoking status: Never Smoker    Smokeless tobacco: Never Used   Vaping Use    Vaping Use: Never used   Substance and Sexual Activity    Alcohol use: Yes     Comment: beer on weekends    Drug use: No    Sexual activity: Yes   Other Topics Concern    Not on file   Social History Narrative    Not on file     Social Determinants of Health     Financial Resource Strain:     Difficulty of Paying Living Expenses:    Food Insecurity:     Worried About Running Out of Food in the Last Year:     [address] in the Last Year:    Transportation Needs:     Lack of Transportation (Medical):  Lack of Transportation (Non-Medical):    Physical Activity:     Days of Exercise per Week:     Minutes of Exercise per Session:    Stress:     Feeling of Stress :    Social Connections:     Frequency of Communication with Friends and Family:     Frequency of Social Gatherings with Friends and Family:     Attends [employer] Services:     Active Member of Clubs or Organizations:     Attends Club or Organization Meetings:     Marital Status:    Intimate Partner Violence:     Fear of Current or Ex-Partner:     Emotionally Abused:     Physically Abused:     Sexually Abused:        I have reviewed the patient's medical history in detail; there are no changes to the history as noted in the electronic medical record  Objective     Vitals:    08/13/21 1539 08/13/21 1600   BP: 150/88 134/80   Pulse: 60    Resp: 16    Temp: 98 2 °F (36 8 °C)    SpO2: 98%    Weight: 76 3 kg (168 lb 4 oz)    Height: 5' 8 5" (1 74 m)      Wt Readings from Last 3 Encounters:   08/13/21 76 3 kg (168 lb 4 oz)   02/07/20 80 6 kg (177 lb 9 6 oz)   01/17/19 79 kg (174 lb 3 2 oz)     Physical Exam  Vitals and nursing note reviewed  Constitutional:       General: He is not in acute distress  Appearance: Normal appearance  He is well-developed  He is not ill-appearing  HENT:      Head: Normocephalic and atraumatic  Right Ear: Tympanic membrane and ear canal normal       Left Ear: Tympanic membrane and ear canal normal    Eyes:      Conjunctiva/sclera: Conjunctivae normal       Pupils: Pupils are equal, round, and reactive to light  Neck:      Thyroid: No thyromegaly  Cardiovascular:      Rate and Rhythm: Normal rate and regular rhythm  Heart sounds:  No murmur heard  Pulmonary:      Effort: Pulmonary effort is normal  No respiratory distress  Breath sounds: Normal breath sounds  No wheezing or rales  Abdominal:      General: Bowel sounds are normal       Palpations: Abdomen is soft  Tenderness: There is no abdominal tenderness  Musculoskeletal:      Cervical back: Normal range of motion and neck supple  Right lower leg: No edema  Left lower leg: No edema  Lymphadenopathy:      Cervical: No cervical adenopathy  Skin:     Findings: No rash  Neurological:      Mental Status: He is alert and oriented to person, place, and time  Psychiatric:         Mood and Affect: Mood normal        BMI Counseling: Body mass index is 25 21 kg/m²  The BMI is above normal  Nutrition recommendations include encouraging healthy choices of fruits and vegetables, moderation in carbohydrate intake and increasing intake of lean protein  ALLERGIES:  Allergies   Allergen Reactions    Bactrim [Sulfamethoxazole-Trimethoprim] Hives    Sulfa Antibiotics        Current Medications     Current Outpatient Medications   Medication Sig Dispense Refill    hydrocortisone (ANUSOL-HC) 2 5 % rectal cream INSERT INTO THE RECTUM 3 TIMES A DAY AS NEEDED 30 g 1    zolpidem (AMBIEN) 5 mg tablet Take 1 tablet (5 mg total) by mouth daily at bedtime as needed (as needed) 30 tablet 0     No current facility-administered medications for this visit           Health Maintenance     Health Maintenance   Topic Date Due    Hepatitis C Screening  Never done    COVID-19 Vaccine (1) Never done    HIV Screening  Never done    BMI: Followup Plan  Never done    DTaP,Tdap,and Td Vaccines (1 - Tdap) Never done    Colorectal Cancer Screening  Never done    Annual Physical  02/07/2021    Influenza Vaccine (1) 09/01/2021    Depression Screening PHQ  08/13/2022    BMI: Adult  08/13/2022    Pneumococcal Vaccine: Pediatrics (0 to 5 Years) and At-Risk Patients (6 to 59 Years) Aged Out    HIB Vaccine  Aged Out    Hepatitis B Vaccine  Aged Out    IPV Vaccine  Aged Out    Hepatitis A Vaccine  Aged Out    Meningococcal ACWY Vaccine  Aged Out    HPV Vaccine  Aged Out       There is no immunization history on file for this patient      Chel Gannon

## 2021-08-14 VITALS
WEIGHT: 168.25 LBS | DIASTOLIC BLOOD PRESSURE: 80 MMHG | TEMPERATURE: 98.2 F | RESPIRATION RATE: 16 BRPM | HEART RATE: 60 BPM | OXYGEN SATURATION: 98 % | BODY MASS INDEX: 24.92 KG/M2 | HEIGHT: 69 IN | SYSTOLIC BLOOD PRESSURE: 134 MMHG

## 2021-12-12 DIAGNOSIS — G47.00 INSOMNIA, UNSPECIFIED TYPE: ICD-10-CM

## 2021-12-12 DIAGNOSIS — K64.9 HEMORRHOIDS, UNSPECIFIED HEMORRHOID TYPE: ICD-10-CM

## 2021-12-14 RX ORDER — ZOLPIDEM TARTRATE 5 MG/1
5 TABLET ORAL
Qty: 30 TABLET | Refills: 2 | Status: SHIPPED | OUTPATIENT
Start: 2021-12-14 | End: 2022-03-17

## 2021-12-14 RX ORDER — HYDROCORTISONE 25 MG/G
CREAM TOPICAL
Qty: 30 G | Refills: 1 | Status: SHIPPED | OUTPATIENT
Start: 2021-12-14 | End: 2022-02-21

## 2022-02-18 DIAGNOSIS — K64.9 HEMORRHOIDS, UNSPECIFIED HEMORRHOID TYPE: ICD-10-CM

## 2022-02-21 RX ORDER — HYDROCORTISONE 25 MG/G
CREAM TOPICAL
Qty: 30 G | Refills: 1 | Status: SHIPPED | OUTPATIENT
Start: 2022-02-21 | End: 2022-05-16

## 2022-05-15 DIAGNOSIS — K64.9 HEMORRHOIDS, UNSPECIFIED HEMORRHOID TYPE: ICD-10-CM

## 2022-05-16 RX ORDER — HYDROCORTISONE 25 MG/G
CREAM TOPICAL
Qty: 30 G | Refills: 1 | Status: SHIPPED | OUTPATIENT
Start: 2022-05-16 | End: 2022-06-18

## 2022-06-17 DIAGNOSIS — K64.9 HEMORRHOIDS, UNSPECIFIED HEMORRHOID TYPE: ICD-10-CM

## 2022-06-18 RX ORDER — HYDROCORTISONE 25 MG/G
CREAM TOPICAL
Qty: 30 G | Refills: 1 | Status: SHIPPED | OUTPATIENT
Start: 2022-06-18

## 2022-07-11 ENCOUNTER — OFFICE VISIT (OUTPATIENT)
Dept: FAMILY MEDICINE CLINIC | Facility: CLINIC | Age: 52
End: 2022-07-11
Payer: COMMERCIAL

## 2022-07-11 VITALS
SYSTOLIC BLOOD PRESSURE: 120 MMHG | HEART RATE: 71 BPM | RESPIRATION RATE: 18 BRPM | HEIGHT: 69 IN | TEMPERATURE: 98.5 F | DIASTOLIC BLOOD PRESSURE: 70 MMHG | WEIGHT: 166 LBS | OXYGEN SATURATION: 96 % | BODY MASS INDEX: 24.59 KG/M2

## 2022-07-11 DIAGNOSIS — M54.50 BILATERAL LOW BACK PAIN WITHOUT SCIATICA, UNSPECIFIED CHRONICITY: Primary | ICD-10-CM

## 2022-07-11 PROCEDURE — 3725F SCREEN DEPRESSION PERFORMED: CPT | Performed by: FAMILY MEDICINE

## 2022-07-11 PROCEDURE — 99213 OFFICE O/P EST LOW 20 MIN: CPT | Performed by: FAMILY MEDICINE

## 2022-07-11 RX ORDER — METHOCARBAMOL 500 MG/1
500 TABLET, FILM COATED ORAL 2 TIMES DAILY
Qty: 20 TABLET | Refills: 0 | Status: SHIPPED | OUTPATIENT
Start: 2022-07-11

## 2022-07-11 RX ORDER — PREDNISONE 20 MG/1
TABLET ORAL
Qty: 11 TABLET | Refills: 0 | Status: SHIPPED | OUTPATIENT
Start: 2022-07-11

## 2022-07-11 NOTE — ASSESSMENT & PLAN NOTE
Back pain  Patient given prescription for prednisone tapering dose consisting of 40 mg x 3 days, 20 mg x 3 days, 10 mg x 4 days  He was also given methocarbamol to use 500 mg b i d  He may apply ice to the affected area for 20 minutes daily    Patient will call if symptoms persist after medication completed

## 2022-07-11 NOTE — PROGRESS NOTES
FAMILY PRACTICE OFFICE VISIT       NAME: Matty Ruiz  AGE: 46 y o  SEX: male       : 1970        MRN: [id number]    DATE: 2022  TIME: 5:06 PM    Assessment and Plan     Problem List Items Addressed This Visit        Other    Bilateral low back pain without sciatica - Primary     Back pain  Patient given prescription for prednisone tapering dose consisting of 40 mg x 3 days, 20 mg x 3 days, 10 mg x 4 days  He was also given methocarbamol to use 500 mg b i d  He may apply ice to the affected area for 20 minutes daily  Patient will call if symptoms persist after medication completed           Relevant Medications    predniSONE 20 mg tablet    methocarbamol (ROBAXIN) 500 mg tablet              Chief Complaint     Chief Complaint   Patient presents with    Back Pain     X 1 week        History of Present Illness     Patient in the office with exacerbation of low back pain over the past few days  Patient states he experiences these back pains every few years  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  He denies any acute injuries recently  He does a fair amount of bending and lifting at work  Discomfort is mostly left a paravertebral muscular area extending to left buttocks      Review of Systems   Review of Systems   Constitutional: Negative  Respiratory: Negative  Cardiovascular: Negative  Gastrointestinal: Negative  Genitourinary: Negative  Musculoskeletal: Positive for arthralgias and back pain  Neurological: Negative  Active Problem List     Patient Active Problem List   Diagnosis    Hemorrhoids    Insomnia    Well adult exam    Bilateral low back pain without sciatica       Past Medical History:  History reviewed  No pertinent past medical history  Past Surgical History:  History reviewed  No pertinent surgical history      Family History:  Family History   Problem Relation Age of Onset    Cancer Mother  COPD Mother    Paty Nye Lung cancer Mother     Prostate cancer Father        Social History:  Social History     Socioeconomic History    Marital status: Single     Spouse name: Not on file    Number of children: Not on file    Years of education: Not on file    Highest education level: Not on file   Occupational History    Not on file   Tobacco Use    Smoking status: Never Smoker    Smokeless tobacco: Never Used   Vaping Use    Vaping Use: Never used   Substance and Sexual Activity    Alcohol use: Yes     Comment: beer on weekends    Drug use: No    Sexual activity: Yes   Other Topics Concern    Not on file   Social History Narrative    Not on file     Social Determinants of Health     Financial Resource Strain: Not on file   Food Insecurity: Not on file   Transportation Needs: Not on file   Physical Activity: Not on file   Stress: Not on file   Social Connections: Not on file   Intimate Partner Violence: Not on file   Housing Stability: Not on file       Objective     Vitals:    07/11/22 1536   BP: 120/70   Pulse: 71   Resp: 18   Temp: 98 5 °F (36 9 °C)   SpO2: 96%     Wt Readings from Last 3 Encounters:   07/11/22 75 3 kg (166 lb)   08/13/21 76 3 kg (168 lb 4 oz)   02/07/20 80 6 kg (177 lb 9 6 oz)       Physical Exam  Constitutional:       General: He is not in acute distress  Appearance: Normal appearance  He is not ill-appearing  Musculoskeletal:      Right lower leg: No edema  Left lower leg: No edema  Comments: Muscle strength 5/5 lower extremities  Negative straight leg raising  Decreased range of motion with flexion and extension beyond 15°  Decreased range of motion with twisting or lateral bends  Negative vertebral tenderness to palpation  Minimal tenderness along left paravertebral musculature and left upper buttocks area   Neurological:      General: No focal deficit present  Mental Status: He is alert  Mental status is at baseline     Psychiatric:         Mood and Affect: Mood normal          Behavior: Behavior normal          Thought Content: Thought content normal          Judgment: Judgment normal          Pertinent Laboratory/Diagnostic Studies:  No results found for: GLUCOSE, BUN, CREATININE, CALCIUM, NA, K, CO2, CL  No results found for: ALT, AST, GGT, ALKPHOS, BILITOT    No results found for: WBC, HGB, HCT, MCV, PLT    No results found for: TSH    No results found for: CHOL  No results found for: TRIG  No results found for: HDL  No results found for: LDLCALC  No results found for: HGBA1C    Results for orders placed or performed in visit on 02/22/18   POCT urine dip   Result Value Ref Range    LEUKOCYTE ESTERASE,UA negative     NITRITE,UA negative     SL AMB POCT UROBILINOGEN negative     POCT URINE PROTEIN negative      PH,UA 5 0     BLOOD,UA negative     SPECIFIC GRAVITY,UA 1 030     KETONES,UA negative     BILIRUBIN,UA negative     GLUCOSE, UA negative      COLOR,UA yellow     CLARITY,UA transparent        No orders of the defined types were placed in this encounter  ALLERGIES:  Allergies   Allergen Reactions    Bactrim [Sulfamethoxazole-Trimethoprim] Hives    Sulfa Antibiotics        Current Medications     Current Outpatient Medications   Medication Sig Dispense Refill    hydrocortisone (ANUSOL-HC) 2 5 % rectal cream INSERT RECTALLY 3 TIMES A DAY AS NEEDED 30 g 1    methocarbamol (ROBAXIN) 500 mg tablet Take 1 tablet (500 mg total) by mouth 2 (two) times a day 20 tablet 0    predniSONE 20 mg tablet 2 tabs daily X 3 days, 1 tab daily X 3 days, 1/2 tab daily X 4 days 11 tablet 0    zolpidem (AMBIEN) 5 mg tablet TAKE 1 TABLET BY MOUTH EVERY DAY AT BEDTIME AS NEEDED 30 tablet 3     No current facility-administered medications for this visit           Health Maintenance     Health Maintenance   Topic Date Due    Hepatitis C Screening  Never done    COVID-19 Vaccine (1) Never done    HIV Screening  Never done    DTaP,Tdap,and Td Vaccines (1 - Tdap) Never done    Colorectal Cancer Screening  Never done    Annual Physical  08/13/2022    Influenza Vaccine (1) 09/01/2022    Depression Screening  07/11/2023    BMI: Adult  07/11/2023    Pneumococcal Vaccine: Pediatrics (0 to 5 Years) and At-Risk Patients (6 to 59 Years)  Aged Out    HIB Vaccine  Aged Out    Hepatitis B Vaccine  Aged Out    IPV Vaccine  Aged Out    Hepatitis A Vaccine  Aged Out    Meningococcal ACWY Vaccine  Aged Out    HPV Vaccine  Aged Out       There is no immunization history on file for this patient      Awan MD Jason

## 2022-07-22 DIAGNOSIS — G47.00 INSOMNIA, UNSPECIFIED TYPE: ICD-10-CM

## 2022-07-25 RX ORDER — ZOLPIDEM TARTRATE 5 MG/1
TABLET ORAL
Qty: 30 TABLET | Refills: 3 | Status: SHIPPED | OUTPATIENT
Start: 2022-07-25

## 2022-08-21 DIAGNOSIS — K64.9 HEMORRHOIDS, UNSPECIFIED HEMORRHOID TYPE: ICD-10-CM

## 2022-08-22 RX ORDER — HYDROCORTISONE 25 MG/G
CREAM TOPICAL
Qty: 30 G | Refills: 1 | Status: SHIPPED | OUTPATIENT
Start: 2022-08-22

## 2022-11-03 ENCOUNTER — VBI (OUTPATIENT)
Dept: ADMINISTRATIVE | Facility: OTHER | Age: 52
End: 2022-11-03

## 2022-11-14 DIAGNOSIS — G47.00 INSOMNIA, UNSPECIFIED TYPE: ICD-10-CM

## 2022-11-14 RX ORDER — ZOLPIDEM TARTRATE 5 MG/1
TABLET ORAL
Qty: 30 TABLET | Refills: 3 | Status: SHIPPED | OUTPATIENT
Start: 2022-11-14

## 2022-11-15 DIAGNOSIS — K64.9 HEMORRHOIDS, UNSPECIFIED HEMORRHOID TYPE: ICD-10-CM

## 2022-11-15 RX ORDER — HYDROCORTISONE 25 MG/G
CREAM TOPICAL
Qty: 30 G | Refills: 1 | Status: SHIPPED | OUTPATIENT
Start: 2022-11-15

## 2023-01-09 ENCOUNTER — OFFICE VISIT (OUTPATIENT)
Dept: FAMILY MEDICINE CLINIC | Facility: CLINIC | Age: 53
End: 2023-01-09

## 2023-01-09 ENCOUNTER — NURSE TRIAGE (OUTPATIENT)
Dept: OTHER | Facility: OTHER | Age: 53
End: 2023-01-09

## 2023-01-09 VITALS
DIASTOLIC BLOOD PRESSURE: 80 MMHG | HEART RATE: 63 BPM | RESPIRATION RATE: 18 BRPM | BODY MASS INDEX: 24.59 KG/M2 | TEMPERATURE: 98.9 F | SYSTOLIC BLOOD PRESSURE: 150 MMHG | OXYGEN SATURATION: 97 % | WEIGHT: 166 LBS | HEIGHT: 69 IN

## 2023-01-09 DIAGNOSIS — H60.93 OTITIS EXTERNA OF BOTH EARS, UNSPECIFIED CHRONICITY, UNSPECIFIED TYPE: Primary | ICD-10-CM

## 2023-01-09 RX ORDER — NEOMYCIN SULFATE, POLYMYXIN B SULFATE AND HYDROCORTISONE 10; 3.5; 1 MG/ML; MG/ML; [USP'U]/ML
5 SUSPENSION/ DROPS AURICULAR (OTIC) 2 TIMES DAILY
Qty: 10 ML | Refills: 0 | Status: SHIPPED | OUTPATIENT
Start: 2023-01-09

## 2023-01-09 NOTE — TELEPHONE ENCOUNTER
Regarding: double ear infection  ----- Message from Daljit Floyd sent at 1/9/2023  9:24 AM EST -----  " I've had an ear infection in both ears since last Friday "

## 2023-01-09 NOTE — TELEPHONE ENCOUNTER
Patient called in stating earche since Friday  Nasal congestion and muffled hearing  Pain 7/10  kayceewinnie motrin   Denies fever or other symptoms   Appointment for [id number] with Dr Rosio Almonte   Provided care advice  Reason for Disposition  • All other earaches (Exceptions: earache lasting < 1 hour, and earache from air travel)    Answer Assessment - Initial Assessment Questions  1  LOCATION: "Which ear is involved?"      Both ears   2  ONSET: "When did the ear start hurting"       Friday   3  SEVERITY: "How bad is the pain?"  (Scale 1-10; mild, moderate or severe)    - MILD (1-3): doesn't interfere with normal activities     - MODERATE (4-7): interferes with normal activities or awakens from sleep     - SEVERE (8-10): excruciating pain, unable to do any normal activities   7/10  4  URI SYMPTOMS: "Do you have a runny nose or cough?"      Cough and nasal congestion   5  FEVER: "Do you have a fever?" If Yes, ask: "What is your temperature, how was it measured, and when did it start?"      Denies   6  CAUSE: "Have you been swimming recently?", "How often do you use Q-TIPS?", "Have you had any recent air travel or scuba diving?"      Denies   7   OTHER SYMPTOMS: "Do you have any other symptoms?" (e g , headache, stiff neck, dizziness, vomiting, runny nose, decreased hearing)  Hearing muffled    Protocols used: EARACHE-ADULT-[place]

## 2023-01-10 PROBLEM — H60.93 OTITIS EXTERNA OF BOTH EARS: Status: ACTIVE | Noted: 2023-01-10

## 2023-01-10 NOTE — ASSESSMENT & PLAN NOTE
Otitis externa  Patient given prescription to try Cortisporin otic suspension and use 5 drops to each ear canal twice daily for 5 days    Patient will call if symptoms persist after medication completed

## 2023-01-10 NOTE — PROGRESS NOTES
FAMILY PRACTICE OFFICE VISIT       NAME: Matty Ruiz  AGE: 45 y o  SEX: male       : 1970        MRN: [id number]    DATE: 1/10/2023  TIME: 6:45 AM    Assessment and Plan     Problem List Items Addressed This Visit        Nervous and Auditory    Otitis externa of both ears - Primary     Otitis externa  Patient given prescription to try Cortisporin otic suspension and use 5 drops to each ear canal twice daily for 5 days  Patient will call if symptoms persist after medication completed         Relevant Medications    neomycin-polymyxin-hydrocortisone (CORTISPORIN) 0 35%-10,000 units/mL-1% otic suspension           Chief Complaint     Chief Complaint   Patient presents with   • Earache     Right sided since Friday   • COVID-19     Did not test   • Sore Throat     PND   • Nasal Congestion   • [place] Cologuard     Discuss        History of Present Illness     Patient in the office with bilateral ear pressure  He does wear protective hearing at his job but does not wear earplugs  He denies any other symptoms  He denies any recent illness or fever    Earache   Associated symptoms include a sore throat  Sore Throat   Associated symptoms include ear pain  Review of Systems   Review of Systems   Constitutional: Negative  HENT: Positive for ear pain and sore throat  Respiratory: Negative  Cardiovascular: Negative  Gastrointestinal: Negative  Active Problem List     Patient Active Problem List   Diagnosis   • Hemorrhoids   • Insomnia   • Well adult exam   • Bilateral low back pain without sciatica   • Otitis externa of both ears       Past Medical History:  History reviewed  No pertinent past medical history  Past Surgical History:  History reviewed  No pertinent surgical history      Family History:  Family History   Problem Relation Age of Onset   • Cancer Mother    • COPD Mother    • Lung cancer Mother    • Prostate cancer Father        Social History:  Social History Socioeconomic History   • Marital status: Single     Spouse name: Not on file   • Number of children: Not on file   • Years of education: Not on file   • Highest education level: Not on file   Occupational History   • Not on file   Tobacco Use   • Smoking status: Never   • Smokeless tobacco: Never   Vaping Use   • Vaping Use: Never used   Substance and Sexual Activity   • Alcohol use: Yes     Comment: beer on weekends   • Drug use: No   • Sexual activity: Yes   Other Topics Concern   • Not on file   Social History Narrative   • Not on file     Social Determinants of Health     Financial Resource Strain: Not on file   Food Insecurity: Not on file   Transportation Needs: Not on file   Physical Activity: Not on file   Stress: Not on file   Social Connections: Not on file   Intimate Partner Violence: Not on file   Housing Stability: Not on file       Objective     Vitals:    01/09/23 1549   BP: 150/80   Pulse: 63   Resp: 18   Temp: 98 9 °F (37 2 °C)   SpO2: 97%     Wt Readings from Last 3 Encounters:   01/09/23 75 3 kg (166 lb)   07/11/22 75 3 kg (166 lb)   08/13/21 76 3 kg (168 lb 4 oz)       Physical Exam  Constitutional:       General: He is not in acute distress  Appearance: He is well-developed  He is not ill-appearing  HENT:      Right Ear: Tympanic membrane and external ear normal  There is no impacted cerumen  Left Ear: Tympanic membrane and external ear normal  There is no impacted cerumen  Ears:      Comments: Patient with mild bilateral swelling of your canal   No discharge noted     Mouth/Throat:      Mouth: Mucous membranes are moist       Pharynx: No oropharyngeal exudate or posterior oropharyngeal erythema  Eyes:      General:         Right eye: No discharge  Left eye: No discharge  Extraocular Movements: Extraocular movements intact  Conjunctiva/sclera: Conjunctivae normal       Pupils: Pupils are equal, round, and reactive to light     Lymphadenopathy: Cervical: No cervical adenopathy  Neurological:      Mental Status: He is alert  Pertinent Laboratory/Diagnostic Studies:  No results found for: GLUCOSE, BUN, CREATININE, CALCIUM, NA, K, CO2, CL  No results found for: ALT, AST, GGT, ALKPHOS, BILITOT    No results found for: WBC, HGB, HCT, MCV, PLT    No results found for: TSH    No results found for: CHOL  No results found for: TRIG  No results found for: HDL  No results found for: LDLCALC  No results found for: HGBA1C    Results for orders placed or performed in visit on 02/22/18   POCT urine dip   Result Value Ref Range    LEUKOCYTE ESTERASE,UA negative     NITRITE,UA negative     SL AMB POCT UROBILINOGEN negative     POCT URINE PROTEIN negative      PH,UA 5 0     BLOOD,UA negative     SPECIFIC GRAVITY,UA 1 030     KETONES,UA negative     BILIRUBIN,UA negative     GLUCOSE, UA negative      COLOR,UA yellow     CLARITY,UA transparent        No orders of the defined types were placed in this encounter  ALLERGIES:  Allergies   Allergen Reactions   • Bactrim [Sulfamethoxazole-Trimethoprim] Hives   • Sulfa Antibiotics        Current Medications     Current Outpatient Medications   Medication Sig Dispense Refill   • hydrocortisone (ANUSOL-HC) 2 5 % rectal cream Use tid prn 30 g 1   • methocarbamol (ROBAXIN) 500 mg tablet Take 1 tablet (500 mg total) by mouth 2 (two) times a day 20 tablet 0   • neomycin-polymyxin-hydrocortisone (CORTISPORIN) 0 35%-10,000 units/mL-1% otic suspension Administer 5 drops into both ears 2 (two) times a day 10 mL 0   • zolpidem (AMBIEN) 5 mg tablet TAKE 1 TABLET BY MOUTH EVERY DAY AT BEDTIME AS NEEDED 30 tablet 3   • predniSONE 20 mg tablet 2 tabs daily X 3 days, 1 tab daily X 3 days, 1/2 tab daily X 4 days 11 tablet 0     No current facility-administered medications for this visit           Health Maintenance     Health Maintenance   Topic Date Due   • Hepatitis C Screening  Never done   • Hepatitis B Vaccine (1 of 3 - 3-dose series) Never done   • HIV Screening  Never done   • DTaP,Tdap,and Td Vaccines (1 - Tdap) Never done   • Colorectal Cancer Screening  Never done   • Annual Physical  08/13/2022   • COVID-19 Vaccine (1) 04/10/2023 (Originally 6/19/1971)   • Influenza Vaccine (1) 06/30/2023 (Originally 9/1/2022)   • Depression Screening  01/09/2024   • BMI: Adult  01/09/2024   • Pneumococcal Vaccine: Pediatrics (0 to 5 Years) and At-Risk Patients (6 to 59 Years)  Aged Out   • HIB Vaccine  Aged Out   • IPV Vaccine  Aged Out   • Hepatitis A Vaccine  Aged Out   • Meningococcal ACWY Vaccine  Aged Out   • HPV Vaccine  Aged Out       There is no immunization history on file for this patient      Nayana Silverio MD

## 2023-01-16 ENCOUNTER — OFFICE VISIT (OUTPATIENT)
Dept: FAMILY MEDICINE CLINIC | Facility: CLINIC | Age: 53
End: 2023-01-16

## 2023-01-16 VITALS
DIASTOLIC BLOOD PRESSURE: 80 MMHG | SYSTOLIC BLOOD PRESSURE: 134 MMHG | TEMPERATURE: 98.4 F | BODY MASS INDEX: 25.09 KG/M2 | RESPIRATION RATE: 18 BRPM | OXYGEN SATURATION: 96 % | WEIGHT: 169.38 LBS | HEART RATE: 61 BPM | HEIGHT: 69 IN

## 2023-01-16 DIAGNOSIS — H66.93 BILATERAL OTITIS MEDIA, UNSPECIFIED OTITIS MEDIA TYPE: ICD-10-CM

## 2023-01-16 DIAGNOSIS — H60.93 OTITIS EXTERNA OF BOTH EARS, UNSPECIFIED CHRONICITY, UNSPECIFIED TYPE: Primary | ICD-10-CM

## 2023-01-16 PROBLEM — H66.90 OTITIS MEDIA: Status: ACTIVE | Noted: 2023-01-16

## 2023-01-16 RX ORDER — PREDNISONE 20 MG/1
TABLET ORAL
Qty: 10 TABLET | Refills: 0 | Status: SHIPPED | OUTPATIENT
Start: 2023-01-16

## 2023-01-16 RX ORDER — AZITHROMYCIN 250 MG/1
TABLET, FILM COATED ORAL
Qty: 6 TABLET | Refills: 0 | Status: SHIPPED | OUTPATIENT
Start: 2023-01-16 | End: 2023-01-21

## 2023-01-16 NOTE — PROGRESS NOTES
FAMILY PRACTICE OFFICE VISIT       NAME: Matty Ruiz  AGE: 45 y o  SEX: male       : 1970        MRN: [id number]    DATE: 2023  TIME: 4:09 PM    Assessment and Plan     Problem List Items Addressed This Visit        Nervous and Auditory    Otitis externa of both ears - Primary    Relevant Medications    azithromycin (Zithromax) 250 mg tablet    predniSONE 20 mg tablet    Other Relevant Orders    Ambulatory Referral to Otolaryngology    Otitis media     Otitis media  Patient given prescription for Zithromax Z-Ramses to take as directed as well as prednisone 40 mg once daily for 5 days  If symptoms persist without improvement in 1 week patient was given referral to see [facility] for further evaluation                Chief Complaint     Chief Complaint   Patient presents with   • EAR CLEANING        History of Present Illness     Patient in the office with continued feelings of crackling and muffled hearing bilaterally  He has mild ear discomfort on his left ear greater than right  He completed course of Cortisporin otic drops  He denies any fever or coughing but does have mild sore throat  Review of Systems   Review of Systems   Constitutional: Negative  HENT: Positive for ear pain, hearing loss and sore throat  Respiratory: Negative  Cardiovascular: Negative  Gastrointestinal: Negative  Neurological: Negative  Active Problem List     Patient Active Problem List   Diagnosis   • Hemorrhoids   • Insomnia   • Well adult exam   • Bilateral low back pain without sciatica   • Otitis externa of both ears   • Otitis media       Past Medical History:  History reviewed  No pertinent past medical history  Past Surgical History:  History reviewed  No pertinent surgical history      Family History:  Family History   Problem Relation Age of Onset   • Cancer Mother    • COPD Mother    • Lung cancer Mother    • Prostate cancer Father        Social History:  Social History Socioeconomic History   • Marital status: Single     Spouse name: Not on file   • Number of children: Not on file   • Years of education: Not on file   • Highest education level: Not on file   Occupational History   • Not on file   Tobacco Use   • Smoking status: Never   • Smokeless tobacco: Never   Vaping Use   • Vaping Use: Never used   Substance and Sexual Activity   • Alcohol use: Yes     Comment: beer on weekends   • Drug use: No   • Sexual activity: Yes   Other Topics Concern   • Not on file   Social History Narrative   • Not on file     Social Determinants of Health     Financial Resource Strain: Not on file   Food Insecurity: Not on file   Transportation Needs: Not on file   Physical Activity: Not on file   Stress: Not on file   Social Connections: Not on file   Intimate Partner Violence: Not on file   Housing Stability: Not on file       Objective     Vitals:    01/16/23 1543   BP: 134/80   Pulse: 61   Resp: 18   Temp: 98 4 °F (36 9 °C)   SpO2: 96%     Wt Readings from Last 3 Encounters:   01/16/23 76 8 kg (169 lb 6 oz)   01/09/23 75 3 kg (166 lb)   07/11/22 75 3 kg (166 lb)       Physical Exam  Constitutional:       General: He is not in acute distress  Appearance: Normal appearance  He is not ill-appearing  HENT:      Right Ear: Ear canal and external ear normal  There is no impacted cerumen  Left Ear: Ear canal and external ear normal       Ears:      Comments: Dull gray tympanic membranes bilaterally  Mouth/Throat:      Mouth: Mucous membranes are moist       Pharynx: No oropharyngeal exudate or posterior oropharyngeal erythema  Eyes:      General:         Right eye: No discharge  Left eye: No discharge  Extraocular Movements: Extraocular movements intact  Conjunctiva/sclera: Conjunctivae normal       Pupils: Pupils are equal, round, and reactive to light  Cardiovascular:      Rate and Rhythm: Normal rate and regular rhythm        Heart sounds: Normal heart sounds  No murmur heard  Pulmonary:      Effort: Pulmonary effort is normal  No respiratory distress  Breath sounds: Normal breath sounds  No wheezing, rhonchi or rales  Lymphadenopathy:      Cervical: No cervical adenopathy  Neurological:      Mental Status: He is alert  Pertinent Laboratory/Diagnostic Studies:  No results found for: GLUCOSE, BUN, CREATININE, CALCIUM, NA, K, CO2, CL  No results found for: ALT, AST, GGT, ALKPHOS, BILITOT    No results found for: WBC, HGB, HCT, MCV, PLT    No results found for: TSH    No results found for: CHOL  No results found for: TRIG  No results found for: HDL  No results found for: LDLCALC  No results found for: HGBA1C    Results for orders placed or performed in visit on 02/22/18   POCT urine dip   Result Value Ref Range    LEUKOCYTE ESTERASE,UA negative     NITRITE,UA negative     SL AMB POCT UROBILINOGEN negative     POCT URINE PROTEIN negative      PH,UA 5 0     BLOOD,UA negative     SPECIFIC GRAVITY,UA 1 030     KETONES,UA negative     BILIRUBIN,UA negative     GLUCOSE, UA negative      COLOR,UA yellow     CLARITY,UA transparent        Orders Placed This Encounter   Procedures   • Ambulatory Referral to Otolaryngology       ALLERGIES:  Allergies   Allergen Reactions   • Bactrim [Sulfamethoxazole-Trimethoprim] Hives   • Sulfa Antibiotics        Current Medications     Current Outpatient Medications   Medication Sig Dispense Refill   • azithromycin (Zithromax) 250 mg tablet Take 2 tablets (500 mg total) by mouth daily for 1 day, THEN 1 tablet (250 mg total) daily for 4 days   6 tablet 0   • hydrocortisone (ANUSOL-HC) 2 5 % rectal cream Use tid prn 30 g 1   • methocarbamol (ROBAXIN) 500 mg tablet Take 1 tablet (500 mg total) by mouth 2 (two) times a day 20 tablet 0   • neomycin-polymyxin-hydrocortisone (CORTISPORIN) 0 35%-10,000 units/mL-1% otic suspension Administer 5 drops into both ears 2 (two) times a day 10 mL 0   • predniSONE 20 mg tablet 2 tabs daily X 3 days, 1 tab daily X 3 days, 1/2 tab daily X 4 days 11 tablet 0   • predniSONE 20 mg tablet 2 TABS DAILY WITH FOOD 10 tablet 0   • zolpidem (AMBIEN) 5 mg tablet TAKE 1 TABLET BY MOUTH EVERY DAY AT BEDTIME AS NEEDED 30 tablet 3     No current facility-administered medications for this visit  Health Maintenance     Health Maintenance   Topic Date Due   • Hepatitis C Screening  Never done   • HIV Screening  Never done   • DTaP,Tdap,and Td Vaccines (1 - Tdap) Never done   • Colorectal Cancer Screening  Never done   • BMI: Followup Plan  08/13/2022   • Annual Physical  08/13/2022   • COVID-19 Vaccine (1) 04/10/2023 (Originally 6/19/1971)   • Influenza Vaccine (1) 06/30/2023 (Originally 9/1/2022)   • Depression Screening  01/09/2024   • BMI: Adult  01/16/2024   • Pneumococcal Vaccine: Pediatrics (0 to 5 Years) and At-Risk Patients (6 to 59 Years)  Aged Out   • HIB Vaccine  Aged Out   • IPV Vaccine  Aged Out   • Hepatitis A Vaccine  Aged Out   • Meningococcal ACWY Vaccine  Aged Out   • HPV Vaccine  Aged Out       There is no immunization history on file for this patient      Alen Rosario MD

## 2023-01-16 NOTE — ASSESSMENT & PLAN NOTE
Otitis media  Patient given prescription for Zithromax Z-Ramses to take as directed as well as prednisone 40 mg once daily for 5 days    If symptoms persist without improvement in 1 week patient was given referral to see [facility] for further evaluation

## 2023-01-16 NOTE — TELEPHONE ENCOUNTER
Patient states his ear pain has been worsening since being seen in the office on 1/09  Patient would like an appointment this afternoon if possible  Please call patient to discuss

## 2023-03-21 DIAGNOSIS — G47.00 INSOMNIA, UNSPECIFIED TYPE: ICD-10-CM

## 2023-03-21 DIAGNOSIS — K64.9 HEMORRHOIDS, UNSPECIFIED HEMORRHOID TYPE: ICD-10-CM

## 2023-03-21 RX ORDER — HYDROCORTISONE 25 MG/G
CREAM TOPICAL
Qty: 30 G | Refills: 1 | Status: SHIPPED | OUTPATIENT
Start: 2023-03-21 | End: 2023-04-28 | Stop reason: SDUPTHER

## 2023-03-22 RX ORDER — ZOLPIDEM TARTRATE 5 MG/1
TABLET ORAL
Qty: 30 TABLET | Refills: 3 | Status: SHIPPED | OUTPATIENT
Start: 2023-03-22

## 2023-04-28 DIAGNOSIS — K64.9 HEMORRHOIDS, UNSPECIFIED HEMORRHOID TYPE: ICD-10-CM

## 2023-04-28 RX ORDER — HYDROCORTISONE 25 MG/G
CREAM TOPICAL
Qty: 30 G | Refills: 1 | Status: SHIPPED | OUTPATIENT
Start: 2023-04-28 | End: 2023-05-01 | Stop reason: SDUPTHER

## 2023-05-01 ENCOUNTER — OFFICE VISIT (OUTPATIENT)
Dept: FAMILY MEDICINE CLINIC | Facility: CLINIC | Age: 53
End: 2023-05-01

## 2023-05-01 ENCOUNTER — TELEPHONE (OUTPATIENT)
Dept: OTHER | Facility: OTHER | Age: 53
End: 2023-05-01

## 2023-05-01 VITALS
OXYGEN SATURATION: 97 % | HEART RATE: 63 BPM | HEIGHT: 69 IN | BODY MASS INDEX: 25.48 KG/M2 | SYSTOLIC BLOOD PRESSURE: 124 MMHG | RESPIRATION RATE: 18 BRPM | WEIGHT: 172 LBS | DIASTOLIC BLOOD PRESSURE: 82 MMHG | TEMPERATURE: 98.7 F

## 2023-05-01 DIAGNOSIS — Z12.11 SCREENING FOR COLON CANCER: ICD-10-CM

## 2023-05-01 DIAGNOSIS — K64.9 HEMORRHOIDS, UNSPECIFIED HEMORRHOID TYPE: Primary | ICD-10-CM

## 2023-05-01 RX ORDER — HYDROCORTISONE 25 MG/G
CREAM TOPICAL
Qty: 30 G | Refills: 3 | Status: SHIPPED | OUTPATIENT
Start: 2023-05-01

## 2023-05-01 NOTE — ASSESSMENT & PLAN NOTE
Trial of hemorrhoid pillow during work or when sitting for prolonged periods  Continue Anusol, apply liberally TID  Continue sitz baths  Referral to GI for colonoscopy for colon cancer screening and potential interventions to address hemorrhoids

## 2023-05-01 NOTE — PROGRESS NOTES
Name: Estela Boykin      : 1970      MRN: [id number]  Encounter Provider: Shelia Rolle DO  Encounter Date: 2023   Encounter department: [address] [place] [address] Dr Flores  Hemorrhoids, unspecified hemorrhoid type  Assessment & Plan:  Trial of hemorrhoid pillow during work or when sitting for prolonged periods  Continue Anusol, apply liberally TID  Continue sitz baths  Referral to GI for colonoscopy for colon cancer screening and potential interventions to address hemorrhoids  Orders:  -     hydrocortisone (ANUSOL-HC) 2 5 % rectal cream; Use tid prn    2  Screening for colon cancer  -     Ambulatory referral for colonoscopy; Future           Subjective      HPI   Patient presents for one month of increasing anal/perianal pain  Was prescribed hydrocortisone cream for hemorrhoids, lapsed  Now having significant pain in the perineum as well, constant burning and pain  Hemorrhoids present for over 6 years  Has tried sitz baths  Pain with BMs, not straining  No blood on toilet paper  Has never seen GI   Knows he is overdue for initial screening colonoscopy and requesting a referral     Review of Systems as in HPI    Current Outpatient Medications on File Prior to Visit   Medication Sig    methocarbamol (ROBAXIN) 500 mg tablet Take 1 tablet (500 mg total) by mouth 2 (two) times a day    neomycin-polymyxin-hydrocortisone (CORTISPORIN) 0 35%-10,000 units/mL-1% otic suspension Administer 5 drops into both ears 2 (two) times a day    predniSONE 20 mg tablet 2 tabs daily X 3 days, 1 tab daily X 3 days, 1/2 tab daily X 4 days    predniSONE 20 mg tablet 2 TABS DAILY WITH FOOD    zolpidem (AMBIEN) 5 mg tablet TAKE 1 TABLET BY MOUTH EVERY DAY AT BEDTIME AS NEEDED    [DISCONTINUED] hydrocortisone (ANUSOL-HC) 2 5 % rectal cream Use tid prn       Objective     /82 (BP Location: Left arm, Patient Position: Sitting, Cuff Size: Large)   Pulse 63   Temp 98 7 °F (37 1 °C) (Temporal) " Resp 18   Ht 5' 8 5\" (1 74 m)   Wt 78 kg (172 lb)   SpO2 97%   BMI 25 77 kg/m²     Physical Exam  Vitals reviewed  Constitutional:       Appearance: Normal appearance  HENT:      Head: Normocephalic and atraumatic  Cardiovascular:      Rate and Rhythm: Normal rate  Pulmonary:      Effort: Pulmonary effort is normal    Abdominal:      General: Abdomen is flat  Genitourinary:     Comments: Multiple external hemorrhoids, no active bleeding, no obvious surrounding infection  Skin:     General: Skin is warm and dry  Neurological:      Mental Status: He is alert         Nanda Croak, DO  "

## 2023-05-03 ENCOUNTER — CONSULT (OUTPATIENT)
Dept: GASTROENTEROLOGY | Facility: AMBULARY SURGERY CENTER | Age: 53
End: 2023-05-03

## 2023-05-03 VITALS
SYSTOLIC BLOOD PRESSURE: 122 MMHG | OXYGEN SATURATION: 98 % | HEART RATE: 62 BPM | HEIGHT: 69 IN | BODY MASS INDEX: 24.73 KG/M2 | WEIGHT: 167 LBS | DIASTOLIC BLOOD PRESSURE: 80 MMHG

## 2023-05-03 DIAGNOSIS — K60.2 ANAL FISSURE: Primary | ICD-10-CM

## 2023-05-03 DIAGNOSIS — Z12.11 SCREENING FOR COLON CANCER: ICD-10-CM

## 2023-05-03 DIAGNOSIS — H60.93 OTITIS EXTERNA OF BOTH EARS, UNSPECIFIED CHRONICITY, UNSPECIFIED TYPE: ICD-10-CM

## 2023-05-03 NOTE — PATIENT INSTRUCTIONS
Scheduled date of colonoscopy (as of today): 05/22/23  Physician performing colonoscopy: dr calvin  Location of colonoscopy: [facility]  Bowel prep reviewed with patient: miralax  dulcolax  Instructions reviewed with patient by:silas  Clearances:

## 2023-05-03 NOTE — PROGRESS NOTES
Consultation - [facility]  [facility] 45 y o  male MRN: [id number]  Unit/Bed#:  Encounter: [id number]        Consults    ASSESSMENT/PLAN:     1  Colon cancer screening-average risk, no previous colonoscopy, no family history of colon cancer  No change in bowel habits     -We will schedule average rescreening colonoscopy at this time     -Discussed with patient regarding the importance of adequate bowel prep  -Recommend MiraLAX/Dulcolax prep     -Would recommend checking CBC and CMP, no blood work noted and [facility] network  patient was explained about the risks and benefits of the procedure  Risks including but not limited to bleeding, infection, perforation were explained in detail  Also explained about less than 100% sensitivity with the exam and other alternatives  2   Anal fissure and external hemorrhoids-noted to have small nonbleeding anal fissure and a posterior wall on rectal exam along with 2  nonthrombosed external hemorrhoids  Patient reports pain in the anal area-only partly relieved with Anusol cream   -Would recommend lidocaine/nifedipine rectal ointment every 6 hours as needed  -Recommend adding a fiber supplement to minimize straining, recommend Metamucil or Benefiber 1 tablespoon on daily basis  -Avoid straining, recommend sitz bath's for hemorrhoids   -If no significant improvement, would recommend referral to colorectal surgery  ______________________________________________________________________    Reason for Consult / Principal Problem: [unfilled]    HPI: [facility] is a 45y o  year old male with history of hypertension, presents for colon cancer screening evaluation  Patient reports that he has been having a lot of rectal pain for some time, reports that he was seen by the PCP who prescribed him Anusol cream for hemorrhoids  Patient reports that he is running out of his cream today and is worried that he will have severe pain    He reports that even with the Anusol cream, he is still having breakthrough symptoms of pain  Reports rare symptoms of rectal bleeding  Denies constipation  He reports having 1-2 bowel movements that are formed on daily basis  Denies any change in bowel habits  No prior colonoscopy  No family history of colon cancer  He has never had a colonoscopy  He reports rare symptoms of reflux only with certain dietary indiscretions, no dysphagia, dyne aphasia, loss of appetite or early satiety  No recent blood work is available in the chart for my review  There is x-ray of the lumbar spine from 2020 only however no other abdominal imaging  Review of Systems: The remainder of the review of systems was negative except for the pertinent positives noted in HPI  Historical Information   Past Medical History:   Diagnosis Date    Hypertension      No past surgical history on file  Social History   Social History     Substance and Sexual Activity   Alcohol Use Yes    Comment: beer on weekends     Social History     Substance and Sexual Activity   Drug Use No     Social History     Tobacco Use   Smoking Status Never   Smokeless Tobacco Never     Family History   Problem Relation Age of Onset    Cancer Mother     COPD Mother     Lung cancer Mother     Prostate cancer Father     Hypertension Family        Meds/Allergies     (Not in a hospital admission)    No current facility-administered medications for this visit  Allergies   Allergen Reactions    Bactrim [Sulfamethoxazole-Trimethoprim] Hives    Sulfa Antibiotics        Objective     There were no vitals taken for this visit      [unfilled]    PHYSICAL EXAM     GEN: well nourished, well developed, no acute distress  HEENT: anicteric, MMM, no cervical or supraclavicular lymphadenopathy  CV: RRR, no m/r/g  CHEST: CTA b/l, no WRR  ABD: +BS, soft, NT/ND, no hepatosplenomegaly  EXT: no c/c/e  SKIN: no rashes,  NEURO: aaox3  Rectum: anal fissure noted on posterior wall, 2 nonthrombosed external hemorrhoids  Normal sphincter tone  Lab Results:   No visits with results within 1 Day(s) from this visit     Latest known visit with results is:   Office Visit on 02/22/2018   Component Date Value    LEUKOCYTE ESTERASE,UA 02/22/2018 negative     NITRITE,UA 02/22/2018 negative     SL AMB POCT UROBILINOGEN 02/22/2018 negative     POCT URINE PROTEIN 02/22/2018 negative      PH,UA 02/22/2018 5 0     BLOOD,UA 02/22/2018 negative     SPECIFIC GRAVITY,UA 02/22/2018 1 030     KETONES,UA 02/22/2018 negative     BILIRUBIN,UA 02/22/2018 negative     GLUCOSE, UA 02/22/2018 negative      COLOR,UA 02/22/2018 yellow     CLARITY,UA 02/22/2018 transparent      Imaging Studies: I have personally reviewed pertinent films in PACS

## 2023-05-03 NOTE — LETTER
May 3, 2023     Darci Rhoades DO  [address] [facility] [address]    Patient: Trixie Salazar   YOB: 1970   Date of Visit: 5/3/2023       Dear Dr Idalmis Gunter: Thank you for referring Trixie Salazar to me for evaluation  Below are my notes for this consultation  If you have questions, please do not hesitate to call me  I look forward to following your patient along with you  Sincerely,        Fish Sahu MD        CC: Deborra Goldmann, MD Jhonny Guard, MD  5/3/2023  3:30 PM  Sign when Signing Visit  Consultation - [facility]  Trixie Salazar 45 y o  male MRN: [id number]  Unit/Bed#:  Encounter: [id number]        Consults    ASSESSMENT/PLAN:     1  Colon cancer screening-average risk, no previous colonoscopy, no family history of colon cancer  No change in bowel habits     -We will schedule average rescreening colonoscopy at this time     -Discussed with patient regarding the importance of adequate bowel prep  -Recommend MiraLAX/Dulcolax prep     -Would recommend checking CBC and CMP, no blood work noted and [facility]  patient was explained about the risks and benefits of the procedure  Risks including but not limited to bleeding, infection, perforation were explained in detail  Also explained about less than 100% sensitivity with the exam and other alternatives  2   Anal fissure and external hemorrhoids-noted to have small nonbleeding anal fissure and a posterior wall on rectal exam along with 2  nonthrombosed external hemorrhoids  Patient reports pain in the anal area-only partly relieved with Anusol cream   -Would recommend lidocaine/nifedipine rectal ointment every 6 hours as needed  -Recommend adding a fiber supplement to minimize straining, recommend Metamucil or Benefiber 1 tablespoon on daily basis    -Avoid straining, recommend sitz bath's for hemorrhoids   -If no significant improvement, would recommend referral to colorectal surgery  ______________________________________________________________________    Reason for Consult / Principal Problem: [unfilled]    HPI: Yaw Dowd is a 45y o  year old male with history of hypertension, presents for colon cancer screening evaluation  Patient reports that he has been having a lot of rectal pain for some time, reports that he was seen by the PCP who prescribed him Anusol cream for hemorrhoids  Patient reports that he is running out of his cream today and is worried that he will have severe pain  He reports that even with the Anusol cream, he is still having breakthrough symptoms of pain  Reports rare symptoms of rectal bleeding  Denies constipation  He reports having 1-2 bowel movements that are formed on daily basis  Denies any change in bowel habits  No prior colonoscopy  No family history of colon cancer  He has never had a colonoscopy  He reports rare symptoms of reflux only with certain dietary indiscretions, no dysphagia, dyne aphasia, loss of appetite or early satiety  No recent blood work is available in the chart for my review  There is x-ray of the lumbar spine from 2020 only however no other abdominal imaging  Review of Systems: The remainder of the review of systems was negative except for the pertinent positives noted in HPI  Historical Information   Past Medical History:   Diagnosis Date    Hypertension      No past surgical history on file    Social History   Social History     Substance and Sexual Activity   Alcohol Use Yes    Comment: beer on weekends     Social History     Substance and Sexual Activity   Drug Use No     Social History     Tobacco Use   Smoking Status Never   Smokeless Tobacco Never     Family History   Problem Relation Age of Onset    Cancer Mother     COPD Mother     Lung cancer Mother     Prostate cancer Father     Hypertension Family        Meds/Allergies      (Not in a hospital admission)    No current facility-administered medications for this visit  Allergies   Allergen Reactions    Bactrim [Sulfamethoxazole-Trimethoprim] Hives    Sulfa Antibiotics        Objective      There were no vitals taken for this visit  [unfilled]    PHYSICAL EXAM     GEN: well nourished, well developed, no acute distress  HEENT: anicteric, MMM, no cervical or supraclavicular lymphadenopathy  CV: RRR, no m/r/g  CHEST: CTA b/l, no WRR  ABD: +BS, soft, NT/ND, no hepatosplenomegaly  EXT: no c/c/e  SKIN: no rashes,  NEURO: aaox3    Lab Results:   No visits with results within 1 Day(s) from this visit     Latest known visit with results is:   Office Visit on 02/22/2018   Component Date Value    LEUKOCYTE ESTERASE,UA 02/22/2018 negative     NITRITE,UA 02/22/2018 negative     SL AMB POCT UROBILINOGEN 02/22/2018 negative     POCT URINE PROTEIN 02/22/2018 negative      PH,UA 02/22/2018 5 0     BLOOD,UA 02/22/2018 negative     SPECIFIC GRAVITY,UA 02/22/2018 1 030     KETONES,UA 02/22/2018 negative     BILIRUBIN,UA 02/22/2018 negative     GLUCOSE, UA 02/22/2018 negative      COLOR,UA 02/22/2018 yellow     CLARITY,UA 02/22/2018 transparent      Imaging Studies: I have personally reviewed pertinent films in PACS

## 2023-05-04 ENCOUNTER — TELEPHONE (OUTPATIENT)
Dept: GASTROENTEROLOGY | Facility: AMBULARY SURGERY CENTER | Age: 53
End: 2023-05-04

## 2023-07-02 PROBLEM — Z12.11 SCREENING FOR COLON CANCER: Status: RESOLVED | Noted: 2020-02-07 | Resolved: 2023-07-02

## 2023-07-19 DIAGNOSIS — K64.9 HEMORRHOIDS, UNSPECIFIED HEMORRHOID TYPE: ICD-10-CM

## 2023-07-19 DIAGNOSIS — G47.00 INSOMNIA, UNSPECIFIED TYPE: ICD-10-CM

## 2023-07-20 RX ORDER — HYDROCORTISONE 25 MG/G
CREAM TOPICAL
Qty: 30 G | Refills: 3 | Status: SHIPPED | OUTPATIENT
Start: 2023-07-20

## 2023-07-20 RX ORDER — ZOLPIDEM TARTRATE 5 MG/1
TABLET ORAL
Qty: 30 TABLET | Refills: 3 | Status: SHIPPED | OUTPATIENT
Start: 2023-07-20

## 2023-09-11 ENCOUNTER — VBI (OUTPATIENT)
Dept: ADMINISTRATIVE | Facility: OTHER | Age: 53
End: 2023-09-11

## 2023-11-18 DIAGNOSIS — G47.00 INSOMNIA, UNSPECIFIED TYPE: ICD-10-CM

## 2023-11-18 DIAGNOSIS — K64.9 HEMORRHOIDS, UNSPECIFIED HEMORRHOID TYPE: ICD-10-CM

## 2023-11-20 RX ORDER — HYDROCORTISONE 25 MG/G
CREAM TOPICAL
Qty: 30 G | Refills: 3 | Status: SHIPPED | OUTPATIENT
Start: 2023-11-20

## 2023-11-20 RX ORDER — ZOLPIDEM TARTRATE 5 MG/1
TABLET ORAL
Qty: 30 TABLET | Refills: 2 | Status: SHIPPED | OUTPATIENT
Start: 2023-11-20

## 2024-02-17 DIAGNOSIS — G47.00 INSOMNIA, UNSPECIFIED TYPE: ICD-10-CM

## 2024-02-19 RX ORDER — ZOLPIDEM TARTRATE 5 MG/1
TABLET ORAL
Qty: 30 TABLET | Refills: 1 | Status: SHIPPED | OUTPATIENT
Start: 2024-02-19

## 2024-03-20 DIAGNOSIS — K64.9 HEMORRHOIDS, UNSPECIFIED HEMORRHOID TYPE: ICD-10-CM

## 2024-03-21 RX ORDER — HYDROCORTISONE 25 MG/G
CREAM TOPICAL
Qty: 30 G | Refills: 3 | Status: SHIPPED | OUTPATIENT
Start: 2024-03-21

## 2024-04-17 DIAGNOSIS — G47.00 INSOMNIA, UNSPECIFIED TYPE: ICD-10-CM

## 2024-04-17 RX ORDER — ZOLPIDEM TARTRATE 5 MG/1
TABLET ORAL
Qty: 30 TABLET | Refills: 2 | Status: SHIPPED | OUTPATIENT
Start: 2024-04-17

## 2024-06-11 ENCOUNTER — VBI (OUTPATIENT)
Dept: ADMINISTRATIVE | Facility: OTHER | Age: 54
End: 2024-06-11

## 2024-07-16 DIAGNOSIS — G47.00 INSOMNIA, UNSPECIFIED TYPE: ICD-10-CM

## 2024-07-18 RX ORDER — ZOLPIDEM TARTRATE 5 MG/1
TABLET ORAL
Qty: 30 TABLET | Refills: 2 | Status: SHIPPED | OUTPATIENT
Start: 2024-07-18

## 2024-10-17 DIAGNOSIS — G47.00 INSOMNIA, UNSPECIFIED TYPE: ICD-10-CM

## 2024-10-17 RX ORDER — ZOLPIDEM TARTRATE 5 MG/1
TABLET ORAL
Qty: 30 TABLET | Refills: 2 | Status: SHIPPED | OUTPATIENT
Start: 2024-10-17

## 2024-12-16 ENCOUNTER — VBI (OUTPATIENT)
Dept: ADMINISTRATIVE | Facility: OTHER | Age: 54
End: 2024-12-16

## 2024-12-16 NOTE — TELEPHONE ENCOUNTER
12/16/24 4:11 PM     Chart reviewed for CRC: Colonoscopy ; nothing is submitted to the patient's insurance at this time.     Suri Cheatham MA   [facility]

## 2025-01-16 DIAGNOSIS — G47.00 INSOMNIA, UNSPECIFIED TYPE: ICD-10-CM

## 2025-01-16 DIAGNOSIS — K64.9 HEMORRHOIDS, UNSPECIFIED HEMORRHOID TYPE: ICD-10-CM

## 2025-01-16 RX ORDER — ZOLPIDEM TARTRATE 5 MG/1
5 TABLET ORAL
Qty: 30 TABLET | Refills: 2 | Status: SHIPPED | OUTPATIENT
Start: 2025-01-16

## 2025-01-17 RX ORDER — HYDROCORTISONE 25 MG/G
CREAM TOPICAL
Qty: 30 G | Refills: 3 | Status: SHIPPED | OUTPATIENT
Start: 2025-01-17

## 2025-02-03 ENCOUNTER — OFFICE VISIT (OUTPATIENT)
Dept: FAMILY MEDICINE CLINIC | Facility: CLINIC | Age: 55
End: 2025-02-03
Payer: COMMERCIAL

## 2025-02-03 VITALS
SYSTOLIC BLOOD PRESSURE: 130 MMHG | WEIGHT: 170.13 LBS | HEIGHT: 68 IN | HEART RATE: 61 BPM | TEMPERATURE: 97.8 F | BODY MASS INDEX: 25.78 KG/M2 | DIASTOLIC BLOOD PRESSURE: 60 MMHG | OXYGEN SATURATION: 97 % | RESPIRATION RATE: 18 BRPM

## 2025-02-03 DIAGNOSIS — G47.00 INSOMNIA, UNSPECIFIED TYPE: Primary | ICD-10-CM

## 2025-02-03 DIAGNOSIS — Z00.00 WELL ADULT EXAM: ICD-10-CM

## 2025-02-03 PROCEDURE — 99396 PREV VISIT EST AGE 40-64: CPT | Performed by: FAMILY MEDICINE

## 2025-02-03 NOTE — PROGRESS NOTES
FAMILY PRACTICE OFFICE VISIT       NAME: Matty Ruiz  AGE: 54 y.o. SEX: male       : 1970        MRN: [id number]    DATE: 2025  TIME: 11:48 AM    Assessment and Plan     Problem List Items Addressed This Visit       Insomnia - Primary    Insomnia.  Patient continues to use zolpidem as needed for chronic intermittent insomnia         Relevant Orders    Comprehensive metabolic panel    CBC    TSH, 3rd generation    Well adult exam    Well adult.  Overall the patient appears to be in stable health.  He will obtain blood work as ordered for further evaluation.  He was recommended to see if his insurance will cover Cologuard testing for colon cancer screening.  Patient declined colonoscopy.                Chief Complaint     Chief Complaint   Patient presents with    Follow-up    Medication Management       History of Present Illness     Patient in the office to discuss chronic medical condition.  He denies any recent illness.  He has not seen any other specialists over the past year.  Patient has declined colonoscopy and Cologuard test in the past.  He denies any changes in weight.  He is a non-smoker.  He continues to have struggles with chronic anxiety and uses zolpidem as needed for insomnia.        Review of Systems   Review of Systems   Constitutional: Negative.    HENT: Negative.     Eyes: Negative.    Respiratory: Negative.     Cardiovascular: Negative.    Gastrointestinal: Negative.    Genitourinary: Negative.    Musculoskeletal: Negative.    Skin: Negative.    Neurological: Negative.    Psychiatric/Behavioral:  Positive for sleep disturbance.        Active Problem List     Patient Active Problem List   Diagnosis    Hemorrhoids    Insomnia    Bilateral low back pain without sciatica    Otitis externa of both ears    Otitis media    Well adult exam       Past Medical History:  Past Medical History:   Diagnosis Date    Hypertension        Past Surgical History:  Past Surgical History:   Procedure  Laterality Date    WISDOM TOOTH EXTRACTION         Family History:  Family History   Problem Relation Age of Onset    Cancer Mother     COPD Mother     Lung cancer Mother     Prostate cancer Father     Hypertension Family        Social History:  Social History     Socioeconomic History    Marital status: Single     Spouse name: Not on file    Number of children: Not on file    Years of education: Not on file    Highest education level: Not on file   Occupational History    Not on file   Tobacco Use    Smoking status: Never    Smokeless tobacco: Never   Vaping Use    Vaping status: Never Used   Substance and Sexual Activity    Alcohol use: Yes     Comment: beer on weekends    Drug use: No    Sexual activity: Yes   Other Topics Concern    Not on file   Social History Narrative    Not on file     Social Drivers of Health     Financial Resource Strain: Not on file   Food Insecurity: Not on file   Transportation Needs: Not on file   Physical Activity: Not on file   Stress: Not on file   Social Connections: Not on file   Intimate Partner Violence: Not on file   Housing Stability: Not on file       Objective     Vitals:    02/03/25 1613   BP: 130/60   Pulse: 61   Resp: 18   Temp: 97.8 °F (36.6 °C)   SpO2: 97%     Wt Readings from Last 3 Encounters:   02/03/25 77.2 kg (170 lb 2 oz)   05/03/23 75.8 kg (167 lb)   05/01/23 78 kg (172 lb)       Physical Exam  Constitutional:       General: He is not in acute distress.     Appearance: Normal appearance. He is not ill-appearing.   HENT:      Head: Normocephalic and atraumatic.   Eyes:      General:         Right eye: No discharge.         Left eye: No discharge.      Extraocular Movements: Extraocular movements intact.      Conjunctiva/sclera: Conjunctivae normal.      Pupils: Pupils are equal, round, and reactive to light.   Neck:      Vascular: No carotid bruit.   Cardiovascular:      Rate and Rhythm: Normal rate and regular rhythm.      Heart sounds: Normal heart sounds. No  "murmur heard.  Pulmonary:      Effort: Pulmonary effort is normal.      Breath sounds: Normal breath sounds. No wheezing, rhonchi or rales.   Abdominal:      General: Abdomen is flat. Bowel sounds are normal. There is no distension.      Palpations: Abdomen is soft.      Tenderness: There is no abdominal tenderness. There is no guarding or rebound.   Musculoskeletal:      Right lower leg: No edema.      Left lower leg: No edema.   Lymphadenopathy:      Cervical: No cervical adenopathy.   Skin:     Findings: No rash.   Neurological:      General: No focal deficit present.      Mental Status: He is alert and oriented to person, place, and time.      Cranial Nerves: No cranial nerve deficit.   Psychiatric:         Mood and Affect: Mood normal.         Behavior: Behavior normal.         Thought Content: Thought content normal.         Judgment: Judgment normal.         Pertinent Laboratory/Diagnostic Studies:  No results found for: \"GLUCOSE\", \"BUN\", \"CREATININE\", \"CALCIUM\", \"NA\", \"K\", \"CO2\", \"CL\"  No results found for: \"ALT\", \"AST\", \"GGT\", \"ALKPHOS\", \"BILITOT\"    No results found for: \"WBC\", \"HGB\", \"HCT\", \"MCV\", \"PLT\"    No results found for: \"TSH\"    No results found for: \"CHOL\"  No results found for: \"TRIG\"  No results found for: \"HDL\"  No results found for: \"LDLCALC\"  No results found for: \"HGBA1C\"    Results for orders placed or performed in visit on 02/22/18   POCT urine dip    Collection Time: 02/22/18  5:09 PM   Result Value Ref Range    LEUKOCYTE ESTERASE,UA negative     NITRITE,UA negative     SL AMB POCT UROBILINOGEN negative     POCT URINE PROTEIN negative      PH,UA 5.0     BLOOD,UA negative     SPECIFIC GRAVITY,UA 1.030     KETONES,UA negative     BILIRUBIN,UA negative     GLUCOSE, UA negative      COLOR,UA yellow     CLARITY,UA transparent        Orders Placed This Encounter   Procedures    Comprehensive metabolic panel    CBC    TSH, 3rd generation       ALLERGIES:  Allergies   Allergen Reactions    " Bactrim [Sulfamethoxazole-Trimethoprim] Hives    Sulfa Antibiotics        Current Medications     Current Outpatient Medications   Medication Sig Dispense Refill    hydrocortisone (ANUSOL-HC) 2.5 % rectal cream Use TID prn as directed 30 g 3    zolpidem (AMBIEN) 5 mg tablet TAKE 1 TABLET BY MOUTH EVERY DAY AT BEDTIME AS NEEDED 30 tablet 2    methocarbamol (ROBAXIN) 500 mg tablet Take 1 tablet (500 mg total) by mouth 2 (two) times a day (Patient not taking: Reported on 5/3/2023) 20 tablet 0    neomycin-polymyxin-hydrocortisone (CORTISPORIN) 0.35%-10,000 units/mL-1% otic suspension Administer 5 drops into both ears 2 (two) times a day (Patient not taking: Reported on 5/3/2023) 10 mL 0    predniSONE 20 mg tablet 2 tabs daily X 3 days, 1 tab daily X 3 days, 1/2 tab daily X 4 days (Patient not taking: Reported on 5/3/2023) 11 tablet 0    predniSONE 20 mg tablet 2 TABS DAILY WITH FOOD (Patient not taking: Reported on 5/3/2023) 10 tablet 0     No current facility-administered medications for this visit.         Health Maintenance     Health Maintenance   Topic Date Due    Hepatitis C Screening  Never done    HIV Screening  Never done    DTaP,Tdap,and Td Vaccines (1 - Tdap) Never done    Colorectal Cancer Screening  Never done    Zoster Vaccine (1 of 2) Never done    Depression Screening  01/09/2024    Influenza Vaccine (1) Never done    COVID-19 Vaccine (1 - 2024-25 season) Never done    Annual Physical  02/03/2026    Meningococcal B Vaccine  Aged Out    RSV Vaccine age 0-20 Months  Aged Out    Pneumococcal Vaccine: Pediatrics (0 to 5 Years) and At-Risk Patients (6 to 64 Years)  Aged Out    HIB Vaccine  Aged Out    IPV Vaccine  Aged Out    Hepatitis A Vaccine  Aged Out    Meningococcal ACWY Vaccine  Aged Out    HPV Vaccine  Aged Out       There is no immunization history on file for this patient.    Karthik Garcia MD

## 2025-03-04 ENCOUNTER — OFFICE VISIT (OUTPATIENT)
Dept: FAMILY MEDICINE CLINIC | Facility: CLINIC | Age: 55
End: 2025-03-04
Payer: COMMERCIAL

## 2025-03-04 VITALS
DIASTOLIC BLOOD PRESSURE: 70 MMHG | OXYGEN SATURATION: 99 % | TEMPERATURE: 98.8 F | BODY MASS INDEX: 24.91 KG/M2 | WEIGHT: 164.38 LBS | HEART RATE: 90 BPM | RESPIRATION RATE: 17 BRPM | SYSTOLIC BLOOD PRESSURE: 110 MMHG | HEIGHT: 68 IN

## 2025-03-04 DIAGNOSIS — J06.9 UPPER RESPIRATORY TRACT INFECTION, UNSPECIFIED TYPE: Primary | ICD-10-CM

## 2025-03-04 LAB
SARS-COV-2 AG UPPER RESP QL IA: NEGATIVE
SL AMB POCT RAPID FLU A: NEGATIVE
SL AMB POCT RAPID FLU B: NEGATIVE
VALID CONTROL: NORMAL

## 2025-03-04 PROCEDURE — 99213 OFFICE O/P EST LOW 20 MIN: CPT | Performed by: NURSE PRACTITIONER

## 2025-03-04 PROCEDURE — 87811 SARS-COV-2 COVID19 W/OPTIC: CPT | Performed by: NURSE PRACTITIONER

## 2025-03-04 PROCEDURE — 87804 INFLUENZA ASSAY W/OPTIC: CPT | Performed by: NURSE PRACTITIONER

## 2025-03-04 NOTE — PROGRESS NOTES
"Name: Matty Ruiz      : 1970      MRN: [id number]  Encounter Provider: SEAMUS Wilson  Encounter Date: 3/4/2025   Encounter department: [facility]  :  Assessment & Plan  Upper respiratory tract infection, unspecified type  Negative Flu and COVID-19 rapid tests.   Viral illness.   Continue supportive care.   Rest, fluids.   May continue to use Aleve as needed.   Call if symptoms are not improving or become worse.     Orders:  •  POCT Rapid Covid Ag  •  POCT rapid flu A and B          Depression Screening and Follow-up Plan: Patient was screened for depression during today's encounter. They screened negative with a PHQ-2 score of 0.        History of Present Illness   Matty Ruiz is a 54 year old male presenting today for cold symptoms.     Started 2 days ago.     Cough, dry, non productive.   No sore throat.    Aching.   Cold    Rhinorrhea.   Chest tightness.    Headaches.     Has not taken temperature.     Sick contacts at work.             Review of Systems   Constitutional:  Positive for chills and fatigue. Negative for diaphoresis and fever.   HENT:  Positive for rhinorrhea. Negative for congestion, ear pain and sore throat.    Respiratory:  Positive for cough and chest tightness. Negative for shortness of breath and wheezing.    Cardiovascular: Negative.    Gastrointestinal:  Positive for diarrhea. Negative for nausea and vomiting.   Neurological:  Positive for headaches.       Objective   /70 (Patient Position: Sitting, Cuff Size: Large)   Pulse 90   Temp 98.8 °F (37.1 °C) (Tympanic)   Resp 17   Ht 5' 8\" (1.727 m)   Wt 74.6 kg (164 lb 6 oz)   SpO2 99%   BMI 24.99 kg/m²      Physical Exam  Vitals and nursing note reviewed.   Constitutional:       General: He is not in acute distress.     Appearance: Normal appearance. He is not ill-appearing.   HENT:      Head: Atraumatic.      Right Ear: Tympanic membrane is injected.      Left Ear: Tympanic membrane normal.      " Nose: Rhinorrhea present.      Mouth/Throat:      Mouth: Mucous membranes are moist.      Pharynx: Oropharynx is clear.   Cardiovascular:      Rate and Rhythm: Normal rate and regular rhythm.      Heart sounds: No murmur heard.  Pulmonary:      Effort: Pulmonary effort is normal.      Breath sounds: Normal breath sounds.   Musculoskeletal:      Cervical back: Normal range of motion and neck supple.   Neurological:      Mental Status: He is alert.   Psychiatric:         Mood and Affect: Mood normal.           Current Outpatient Medications:   •  hydrocortisone (ANUSOL-HC) 2.5 % rectal cream, Use TID prn as directed, Disp: 30 g, Rfl: 3  •  zolpidem (AMBIEN) 5 mg tablet, TAKE 1 TABLET BY MOUTH EVERY DAY AT BEDTIME AS NEEDED, Disp: 30 tablet, Rfl: 2

## 2025-04-16 DIAGNOSIS — G47.00 INSOMNIA, UNSPECIFIED TYPE: ICD-10-CM

## 2025-04-17 RX ORDER — ZOLPIDEM TARTRATE 5 MG/1
5 TABLET ORAL
Qty: 30 TABLET | Refills: 2 | Status: SHIPPED | OUTPATIENT
Start: 2025-04-17

## 2025-07-18 DIAGNOSIS — G47.00 INSOMNIA, UNSPECIFIED TYPE: ICD-10-CM

## 2025-07-18 RX ORDER — ZOLPIDEM TARTRATE 5 MG/1
5 TABLET ORAL
Qty: 30 TABLET | Refills: 2 | Status: SHIPPED | OUTPATIENT
Start: 2025-07-18